# Patient Record
(demographics unavailable — no encounter records)

---

## 2021-09-23 NOTE — EMERGENCY DEPARTMENT REPORT
HPI





- General


Chief Complaint: Wound/Laceration


Time Seen by Provider: 21 14:33





- HPI


HPI: 





Room 6








The patient is 61-year-old male present with chief complaint of nonhealing right

hip wound.  Patient status post total right hip replacement approximate 1 month 

ago.  Patient is in the was sent to the ED for evaluation for nonhealing right 

hip surgical wound.  The patient states he does not personally seen his own 

wound but he was told by the physicians at shelter that it is not healing.  Patient

complains of pain and gives it a score of 8/10.  There has been no history of 

fever





ED Past Medical Hx





- Past Medical History


Hx Diabetes: Yes


Hx Liver Disease: Yes


Hx Asthma: Yes


Hx COPD: Yes


Additional medical history: Bronchitis





- Surgical History


Additional Surgical History: Right hip replacement.  Herniorrhaphy





- Family History


Family history: no significant





- Social History


Smoking Status: Former Smoker


Substance Use Type: None





- Medications


Home Medications: 


                                Home Medications











 Medication  Instructions  Recorded  Confirmed  Last Taken  Type


 


Xanax TAB 2 mg PO PRN 21 Unknown History














ED Review of Systems


ROS: 


Stated complaint: LEFT HIP PAIN/IN CUSTODY


Other details as noted in HPI





Constitutional: denies: fever


Eyes: denies: eye pain


ENT: denies: throat pain


Respiratory: no symptoms reported


Cardiovascular: denies: chest pain


Endocrine: no symptoms reported


Gastrointestinal: denies: abdominal pain, nausea, vomiting


Musculoskeletal: arthralgia


Skin: lesions





Physical Exam





- Physical Exam


Vital Signs: 


                                   Vital Signs











  21





  14:05


 


Temperature 98.5 F


 


Blood Pressure 150/73





[Left] 











Physical Exam: 





GENERAL: The patient is well-developed well-nourished male lying on stretcher 

not appearing to be in acute distress. []


HEENT: Normocephalic.  Atraumatic.  Extraocular motions are intact.  Patient has

 moist mucous membranes.


NECK: Supple.  Trachea midline


CHEST/LUNGS: Clear to auscultation.  There is no respiratory distress noted.


HEART/CARDIOVASCULAR: Regular.  There is no tachycardia.  There is no gallop rub

 or murmur.


ABDOMEN: Abdomen is soft, nontender.  Patient has normal bowel sounds.  There is

 no abdominal distention.


SKIN: There is a pedunculated mass/granulation tissue at the distal region of 

the right hip surgical site.  There appears to be serosanguineous drainage 

present on the gauze.  No purulent discharge visualized.  No surrounding 

cellulitis appreciated.


NEURO: The patient is awake, alert, and oriented.  The patient is cooperative.  

The patient has no focal neurologic deficits.  The patient has normal speech.  

GCS 15


MUSCULOSKELETAL:  There is no evidence of acute injury.





ED Course


                                   Vital Signs











  21





  14:05


 


Temperature 98.5 F


 


Blood Pressure 150/73





[Left] 














- Consultations


Consultation #1: 





21 14:53


Photograph of surgical site sent to and Case discussed with Dr. Aly- 

recommends plain film x-ray of the right hip, CBC as well as a CT scan of the 

right hip to rule out fluid collection.  If work-up negative may do wet-to-dry 

dressing changes and have patient follow-up in office


Consultation #2: 





21 16:16


Ortho paged





ED Medical Decision Making





- Lab Data


Result diagrams: 


                                 21 15:15








- Radiology Data


Radiology results: report reviewed (CT right lower extremity, right hip x-ray), 

image reviewed (CT right lower extremity, right hip x-ray)


interpreted by me: 





Right hip x-ray-no acute fracture appreciated.  Prosthesis in place





[facility] [address] [id number] Cat

 Scan Report Signed Patient: RYAN CONDON MR#: [id number] : 1960 

Acct:[id number] Age/Sex: 61 / M ADM Date: 21 Loc: ED Attending Dr: 

Ordering Physician: CRESCENCIO CAMPOVERDE MD Date of Service: 21 Procedure(s): CT 

lower extremity RT wo con Accession Number(s): [id number] cc: CRESCENCIO CAMPOVERDE MD CT 

right hip without contrast INDICATION : Postop, Nonhealing wound. TECHNIQUE: 

Axial imaging performed through the right hip without the use of contrast. All 

CT scans at this location are performed using CT dose reduction for ALARA by 

means of automated exposure control. COMPARISON: Right hip radiograph from 

2021 FINDINGS: There is a periprosthetic fracture centered about the tip of

 the femoral stem component, with obliquely are noted fracture line involving 

the lateral aspect of the proximal one half of the femoral diaphysis. Fracture 

is not significantly displaced. The remaining bones about the right hip are 

intact and unremarkable. No hardware complication otherwise. There is streak 

artifact from the hip arthroplasty which limits the exam; however, there is a 

soft tissue wound extending laterally from the level of roughly the greater tr

ochanter with exit to the skin surface. There is a mildly complex collection 

directly abutting the trochanter which contains fluid intermixed with air and 

measures approximately 2.9 x 2.6 by roughly 5-6 cm craniocaudal. IMPRESSION: 1. 

Periprosthetic fracture as outlined above. 2. Complex collection tracking along 

the lateral aspect of the proximal femur as outlined above with sinus tract 

extending to the subcutaneous tissues along the lateral proximal thigh. Findings

 are worrisome for abscess formation. Signer Name: German Bowman MD Signed: 

2021 3:31 PM Workstation Name: [id number] Transcribed By: GLENN Dictated By: 

German Bowman MD Electronically Authenticated By: German Bowman MD 

Signed Date/Time: 21 1531 DD/DT: 21 1528 TD/TT: 


Print Cancel 








[facility] [address] [place] [address] 

XRay Report Signed Patient: RYAN CONDON MR#: [id number] : 1960 

Acct:[id number] Age/Sex: 61 / M ADM Date: 21 Loc: ED Attending Dr: 

Ordering Physician: CRESCENCIO CAMPOVERDE MD Date of Service: 21 Procedure(s): XR 

hip 2-3V RT Accession Number(s): [id number] cc: CRESCENCIO CAMPOVERDE MD Fluoro Time In Min

utes: TRAUMATIC INDICATION / CLINICAL INFORMATION: Postop, nonhealing wound 

COMPARISON: None available. FINDINGS: BONES / JOINT(S): The periprosthetic 

fracture in the femur noted on CT imaging performed earlier today is not 

identifiable on these conventional images. There is mild narrowing of the left 

hip joint space. There is a right hip arthroplasty. No loosening is seen. SOFT 

TISSUES: There is mild soft tissue swelling laterally over the right hip. 

ADDITIONAL FINDINGS: None. Signer Name: Hugo Baig MD Signed: 2021 3:51 

PM Workstation Name: [id number] Periprosthetic fracture seen. From the 

Transcribed By: GLORY Dictated By: Hugo Baig MD Electronically 

Authenticated By: Hugo Baig MD Signed Date/Time: 21 1551 DD/DT: 

21 1544 TD/TT: 


Print Cancel 











- Differential Diagnosis


Nonhealing surgical wound


Critical care attestation.: 


If time is entered above; I have spent that time in minutes in the direct care 

of this critically ill patient, excluding procedure time.








ED Disposition


Clinical Impression: 


 Nonhealing surgical wound, Postoperative abscess





Disposition:  ADMITTED AS INPATIENT


Is pt being admited?: Yes


Does the pt Need Aspirin: No


Condition: Fair


Referrals: 


PRIMARY CARE,MD [Primary Care Provider] - 3-5 Days


Time of Disposition: 19:02 (Hospitalist notified (Dr. Baca))

## 2021-09-23 NOTE — XRAY REPORT
TRAUMATIC



INDICATION / CLINICAL INFORMATION:

Postop, nonhealing wound



COMPARISON:

None available.

 

FINDINGS:



BONES / JOINT(S): The periprosthetic fracture in the femur noted on CT imaging performed earlier toda
y is not identifiable on these conventional images. There is mild narrowing of the left hip joint spa
ce. There is a right hip arthroplasty. No loosening is seen.

SOFT TISSUES: There is mild soft tissue swelling laterally over the right hip.



ADDITIONAL FINDINGS: None.







Signer Name: Hugo Baig MD 

Signed: 9/23/2021 3:51 PM

Workstation Name: [id number] 

Periprosthetic fracture seen. From the

## 2021-09-23 NOTE — CAT SCAN REPORT
CT right hip without contrast



INDICATION :  Postop, Nonhealing wound.



TECHNIQUE:  Axial imaging performed through the right hip without the use of contrast.  All CT scans 
at this location are performed using CT dose reduction for ALARA by means of automated exposure contr
ol. 



COMPARISON:  Right hip radiograph from 8/12/2021



FINDINGS: There is a periprosthetic fracture centered about the tip of the femoral stem component, wi
th obliquely are noted fracture line involving the lateral aspect of the proximal one half of the fem
oral diaphysis. Fracture is not significantly displaced. The remaining bones about the right hip are 
intact and unremarkable. No hardware complication otherwise.



There is streak artifact from the hip arthroplasty which limits the exam; however, there is a soft ti
ssue wound extending laterally from the level of roughly the greater trochanter with exit to the skin
 surface. There is a mildly complex collection directly abutting the trochanter which contains fluid 
intermixed with air and measures approximately 2.9 x 2.6 by roughly 5-6 cm craniocaudal.





IMPRESSION: 

1. Periprosthetic fracture as outlined above.

2. Complex collection tracking along the lateral aspect of the proximal femur as outlined above with 
sinus tract extending to the subcutaneous tissues along the lateral proximal thigh. Findings are worr
isome for abscess formation.



Signer Name: German Bowman MD 

Signed: 9/23/2021 3:31 PM

Workstation Name: [id number]

## 2021-09-24 NOTE — HISTORY AND PHYSICAL REPORT
History of Present Illness


Date of examination: 09/23/21


Date of admission: 


09/23/21 19:09





Chief complaint: 


Drainage on the right hip surgical site





History of present illness: 


61-year-old male with history of EtOH dependence, hepatitis/cirrhosis, diabetes 

and COPD had total right hip replacement on 8/12/2021 and [address] [place] [address].  Patient was doing well for couple of weeks.  Patient has been 

incarcerated for the last 13 months.  Over the last couple weeks patient has 





Previous admission in August 1 week


Patient is 61 yo  Male with COPD, Nicotine Dependence, ETOH Dependence 

complicated by Cirrhosis, EDWAR, Mild Intermittent Asthma presents to ED for ev

aluation following a fall.  The patient was seen and evaluated in the emergency 

department. Patient found to have right hip fracture, as well as metabolic 

acidosis.  Patient admitted to surgical floor.  Orthopedic surgery service 

consulted.


Right hip fracture with right total hip replacement on 8/12/2021








- Past Medical History


--Diabetes: Yes


--Liver Disease: Yes


--Asthma: Yes


--COPD: Yes


--Additional medical history: Bronchitis





- Surgical History


Additional Surgical History: Right hip replacement.  Herniorrhaphy





- Family History


Family history: no significant





- Social History


Smoking Status: Former Smoker


Substance Use Type: None





- Medications


Home Medications: 


                                Home Medications











 Medication  Instructions  Recorded  Confirmed  Last Taken  Type


 


Xanax TAB 2 mg PO PRN 09/23/21 09/23/21 Unknown History














Review of Systems


ROS: 


Stated complaint: LEFT HIP PAIN/IN CUSTODY


Other details as noted in HPI





Constitutional: denies: fever


Eyes: denies: eye pain


ENT: denies: throat pain


Respiratory: no symptoms reported


Cardiovascular: denies: chest pain


Endocrine: no symptoms reported


Gastrointestinal: denies: abdominal pain, nausea, vomiting


Musculoskeletal: arthralgia


Skin: lesions








Medications and Allergies


                                    Allergies











Allergy/AdvReac Type Severity Reaction Status Date / Time


 


ketorolac tromethamine Allergy  Unknown Verified 09/23/21 14:05





[From Toradol]     











                                Home Medications











 Medication  Instructions  Recorded  Confirmed  Last Taken  Type


 


Xanax TAB 2 mg PO PRN 09/23/21 09/23/21 Unknown History











Active Meds: 


Active Medications





Hydromorphone HCl (Hydromorphone 1 Mg/1 Ml Inj)  0.5 mg IV Q3H PRN


   PRN Reason: Pain , Severe (7-10)


   Last Admin: 09/23/21 22:00 Dose:  0.5 mg


   Documented by: 











Exam





- Constitutional


Vitals: 


                                        











Temp Pulse Resp BP Pulse Ox


 


 98.5 F         149/52   98 


 


 09/23/21 14:05        09/23/21 17:31  09/23/21 17:31











General appearance: Present: no acute distress, well-nourished





- EENT


Eyes: Present: PERRL


ENT: hearing intact, clear oral mucosa





- Neck


Neck: Present: supple, normal ROM





- Respiratory


Respiratory effort: normal


Respiratory: bilateral: CTA





- Cardiovascular


Rhythm: regular


Heart Sounds: Present: S1 & S2.  Absent: rub, click





- Extremities


Extremities: pulses symmetrical, No edema, abnormal (Right hip site drainage and

tenderness present, decreased range of motion)


Extremity abnormal: other (Right hip site drainage and tenderness present)


Peripheral Pulses: within normal limits





- Abdominal


General gastrointestinal: Present: soft, non-tender, non-distended, normal bowel

sounds


Male genitourinary: Present: normal





- Integumentary


Integumentary: Present: clear, warm, dry





- Musculoskeletal


Musculoskeletal: gait normal, strength equal bilaterally





- Psychiatric


Psychiatric: appropriate mood/affect, intact judgment & insight





- Neurologic


Neurologic: CNII-XII intact, moves all extremities





Results





- Labs


CBC & Chem 7: 


                                 09/23/21 15:15





                                 09/23/21 20:18


Labs: 


                             Laboratory Last Values











WBC  5.5 K/mm3 (4.5-11.0)   09/23/21  15:15    


 


RBC  4.58 M/mm3 (3.65-5.03)   09/23/21  15:15    


 


Hgb  14.0 gm/dl (11.8-15.2)   09/23/21  15:15    


 


Hct  41.7 % (35.5-45.6)   09/23/21  15:15    


 


MCV  91 fl (84-94)   09/23/21  15:15    


 


MCH  31 pg (28-32)   09/23/21  15:15    


 


MCHC  34 % (32-34)   09/23/21  15:15    


 


RDW  15.8 % (13.2-15.2)  H  09/23/21  15:15    


 


Plt Count  126 K/mm3 (140-440)  L  09/23/21  15:15    


 


Lymph % (Auto)  22.4 % (13.4-35.0)   09/23/21  15:15    


 


Mono % (Auto)  10.9 % (0.0-7.3)  H  09/23/21  15:15    


 


Eos % (Auto)  2.1 % (0.0-4.3)   09/23/21  15:15    


 


Baso % (Auto)  0.5 % (0.0-1.8)   09/23/21  15:15    


 


Lymph # (Auto)  1.2 K/mm3 (1.2-5.4)   09/23/21  15:15    


 


Mono # (Auto)  0.6 K/mm3 (0.0-0.8)   09/23/21  15:15    


 


Eos # (Auto)  0.1 K/mm3 (0.0-0.4)   09/23/21  15:15    


 


Baso # (Auto)  0.0 K/mm3 (0.0-0.1)   09/23/21  15:15    


 


Seg Neutrophils %  64.1 % (40.0-70.0)   09/23/21  15:15    


 


Seg Neutrophils #  3.5 K/mm3 (1.8-7.7)   09/23/21  15:15    


 


Sodium  134 mmol/L (137-145)  L  09/23/21  20:18    


 


Potassium  4.3 mmol/L (3.6-5.0)   09/23/21  20:18    


 


Chloride  101.9 mmol/L ()   09/23/21  20:18    


 


Carbon Dioxide  21 mmol/L (22-30)  L  09/23/21  20:18    


 


Anion Gap  15 mmol/L  09/23/21  20:18    


 


BUN  15 mg/dL (9-20)   09/23/21  20:18    


 


Creatinine  0.6 mg/dL (0.8-1.3)  L  09/23/21  20:18    


 


Estimated GFR  > 60 ml/min  09/23/21  20:18    


 


BUN/Creatinine Ratio  25 %  09/23/21  20:18    


 


Glucose  232 mg/dL ()  H  09/23/21  20:18    


 


Calcium  9.0 mg/dL (8.4-10.2)   09/23/21  20:18    


 


Urine Color  Yellow  (Yellow)   09/23/21  Unknown


 


Urine Turbidity  Slightly-cloudy  (Clear)   09/23/21  Unknown


 


Urine pH  6.0  (5.0-7.0)   09/23/21  Unknown


 


Ur Specific Gravity  1.024  (1.003-1.030)   09/23/21  Unknown


 


Urine Protein  30 mg/dl mg/dL (Negative)   09/23/21  Unknown


 


Urine Glucose (UA)  50 mg/dL (Negative)   09/23/21  Unknown


 


Urine Ketones  Neg mg/dL (Negative)   09/23/21  Unknown


 


Urine Blood  Neg  (Negative)   09/23/21  Unknown


 


Urine Nitrite  Neg  (Negative)   09/23/21  Unknown


 


Urine Bilirubin  Neg  (Negative)   09/23/21  Unknown


 


Urine Urobilinogen  4.0 mg/dL (<2.0)   09/23/21  Unknown


 


Ur Leukocyte Esterase  Neg  (Negative)   09/23/21  Unknown


 


Urine WBC (Auto)  3.0 /HPF (0.0-6.0)   09/23/21  Unknown


 


Urine RBC (Auto)  4.0 /HPF (0.0-6.0)   09/23/21  Unknown


 


Calcium Oxalate Crystal  Few   09/23/21  Unknown


 


Urine Mucus  1+ /HPF  09/23/21  Unknown


 


Urine Yeast (Budding)  1+ /HPF  09/23/21  Unknown


 


Urine Opiates Screen  Negative   09/23/21  Unknown


 


Urine Methadone Screen  Negative   09/23/21  Unknown


 


Ur Barbiturates Screen  Negative   09/23/21  Unknown


 


Ur Phencyclidine Scrn  Negative   09/23/21  Unknown


 


Ur Amphetamines Screen  Negative   09/23/21  Unknown


 


U Benzodiazepines Scrn  Negative   09/23/21  Unknown


 


Urine Cocaine Screen  Negative   09/23/21  Unknown


 


U Marijuana (THC) Screen  Negative   09/23/21  Unknown


 


Drugs of Abuse Note  Disclamer   09/23/21  Unknown











Microbiology: 


Microbiology





09/23/21 19:08   Peripheral/Venous   Blood Culture - Preliminary


                            Culture in Progress


09/23/21 19:02   Peripheral/Venous   Blood Culture - Preliminary


                            Culture in Progress











- Imaging and Cardiology


Imaging and Cardiology: 





Lower extremity CAT scan


Periprosthetic fracture centered over the tip of the femoral stem component


Complex collection tracking along the lateral border of the proximal femur as 

outlined above with sinus tract extending to the subcutaneous tissues along the 

lateral proximal thigh


Findings are worrisome for abscess formation





Assessment and Plan


Advance Directives: Yes


Plan of care discussed with patient/family: Yes





- Patient Problems


(1) Postoperative abscess


Current Visit: Yes   Status: Acute   


Plan to address problem: 


Abscess in the right hip region at the postoperative site with possible 

recurrent fracture


IV Unasyn and IV vancomycin started


Orthopedic consult


May need revision surgery and abscess drainage


ID also consulted








(2) COPD (chronic obstructive pulmonary disease)


Current Visit: No   Status: Chronic   


Qualifiers: 


   COPD type: chronic bronchitis 


Plan to address problem: 


Nebulizer treatments as needed








(3) Hypertension


Current Visit: Yes   Status: Chronic   


Qualifiers: 


   Hypertension type: primary hypertension   Qualified Code(s): I10 - Essential 

(primary) hypertension   


Plan to address problem: 


Continue antihypertensives and adjust medications








(4) T2DM (type 2 diabetes mellitus)


Current Visit: Yes   Status: Chronic   


Qualifiers: 


   Diabetes mellitus long term insulin use: unspecified long term insulin use 

status 


Plan to address problem: 


Continue coverage and check hemoglobin A1c








(5) DVT prophylaxis


Current Visit: Yes   Status: Acute   


Plan to address problem: 


On heparin and GI prophylaxis

## 2021-09-24 NOTE — PROGRESS NOTE
Assessment and Plan


Assessment and plan: 





(1) Postoperative abscess


Current Visit: Yes   Status: Acute   


Plan to address problem: 


Abscess in the right hip region at the postoperative site with possible 

recurrent fracture


IV Unasyn and IV vancomycin started


Orthopedic consult


May need revision surgery and abscess drainage


ID also consulted








(2) COPD (chronic obstructive pulmonary disease)


Current Visit: No   Status: Chronic   


Qualifiers: 


   COPD type: chronic bronchitis 


Plan to address problem: 


Nebulizer treatments as needed








(3) Hypertension


Current Visit: Yes   Status: Chronic   


Qualifiers: 


   Hypertension type: primary hypertension   Qualified Code(s): I10 - Essential 

(primary) hypertension   


Plan to address problem: 


Continue antihypertensives and adjust medications








(4) T2DM (type 2 diabetes mellitus)


Current Visit: Yes   Status: Chronic   


Qualifiers: 


   Diabetes mellitus long term insulin use: unspecified long term insulin use 

status 


Plan to address problem: 


Continue coverage and check hemoglobin A1c








(5) DVT prophylaxis


Current Visit: Yes   Status: Acute   


Plan to address problem: 


On heparin and GI prophylaxis





9/24


-Patient is admitted for right hip fracture, abscess on the right hip area


-Orthopedics and ID consulted


-Patient is currently on Unasyn and vancomycin


-Blood pressure controlled, blood sugars in target


-Continue current management and follow with orthopedics and ID.





History


Interval history: 





Patient was seen and evaluated this morning


Patient is complaining of right hip pain





Hospitalist Physical





- Physical exam


Narrative exam: 





 Not in cardiopulmonary distress. 


 The patient appeared well nourished and normally developed.


 Vital signs as documented.


 Head exam is unremarkable.


 No scleral icterus .


 Neck is without jugular venous distension, thyromegaly, or carotid bruits. 


 Lungs are clear to auscultation.


Cardiac exam reveals regular rate and  Rhythm. 


Abdominal exam reveals normal bowel sounds, nontender, no organomegaly.


Extremities are nonedematous and both femoral and pedal pulses are normal.


CNS: Alert and oriented 3.  No focal weakness.





- Constitutional


Vitals: 


                                        











Temp Pulse Resp BP Pulse Ox


 


 98.5 F   87   19   132/89   98 


 


 09/23/21 14:05  09/24/21 07:58  09/24/21 08:08  09/24/21 07:58  09/24/21 08:08











General appearance: Present: no acute distress, well-nourished





Results





- Labs


CBC & Chem 7: 


                                 09/23/21 15:15





                                 09/23/21 20:18


Labs: 


                             Laboratory Last Values











WBC  5.5 K/mm3 (4.5-11.0)   09/23/21  15:15    


 


RBC  4.58 M/mm3 (3.65-5.03)   09/23/21  15:15    


 


Hgb  14.0 gm/dl (11.8-15.2)   09/23/21  15:15    


 


Hct  41.7 % (35.5-45.6)   09/23/21  15:15    


 


MCV  91 fl (84-94)   09/23/21  15:15    


 


MCH  31 pg (28-32)   09/23/21  15:15    


 


MCHC  34 % (32-34)   09/23/21  15:15    


 


RDW  15.8 % (13.2-15.2)  H  09/23/21  15:15    


 


Plt Count  126 K/mm3 (140-440)  L  09/23/21  15:15    


 


Lymph % (Auto)  22.4 % (13.4-35.0)   09/23/21  15:15    


 


Mono % (Auto)  10.9 % (0.0-7.3)  H  09/23/21  15:15    


 


Eos % (Auto)  2.1 % (0.0-4.3)   09/23/21  15:15    


 


Baso % (Auto)  0.5 % (0.0-1.8)   09/23/21  15:15    


 


Lymph # (Auto)  1.2 K/mm3 (1.2-5.4)   09/23/21  15:15    


 


Mono # (Auto)  0.6 K/mm3 (0.0-0.8)   09/23/21  15:15    


 


Eos # (Auto)  0.1 K/mm3 (0.0-0.4)   09/23/21  15:15    


 


Baso # (Auto)  0.0 K/mm3 (0.0-0.1)   09/23/21  15:15    


 


Seg Neutrophils %  64.1 % (40.0-70.0)   09/23/21  15:15    


 


Seg Neutrophils #  3.5 K/mm3 (1.8-7.7)   09/23/21  15:15    


 


Sodium  134 mmol/L (137-145)  L  09/23/21  20:18    


 


Potassium  4.3 mmol/L (3.6-5.0)   09/23/21  20:18    


 


Chloride  101.9 mmol/L ()   09/23/21  20:18    


 


Carbon Dioxide  21 mmol/L (22-30)  L  09/23/21  20:18    


 


Anion Gap  15 mmol/L  09/23/21  20:18    


 


BUN  15 mg/dL (9-20)   09/23/21  20:18    


 


Creatinine  0.6 mg/dL (0.8-1.3)  L  09/23/21  20:18    


 


Estimated GFR  > 60 ml/min  09/23/21  20:18    


 


BUN/Creatinine Ratio  25 %  09/23/21  20:18    


 


Glucose  232 mg/dL ()  H  09/23/21  20:18    


 


Hemoglobin A1c  6.9 % (4-6)  H  09/23/21  15:15    


 


Calcium  9.0 mg/dL (8.4-10.2)   09/23/21  20:18    


 


Urine Color  Yellow  (Yellow)   09/23/21  Unknown


 


Urine Turbidity  Slightly-cloudy  (Clear)   09/23/21  Unknown


 


Urine pH  6.0  (5.0-7.0)   09/23/21  Unknown


 


Ur Specific Gravity  1.024  (1.003-1.030)   09/23/21  Unknown


 


Urine Protein  30 mg/dl mg/dL (Negative)   09/23/21  Unknown


 


Urine Glucose (UA)  50 mg/dL (Negative)   09/23/21  Unknown


 


Urine Ketones  Neg mg/dL (Negative)   09/23/21  Unknown


 


Urine Blood  Neg  (Negative)   09/23/21  Unknown


 


Urine Nitrite  Neg  (Negative)   09/23/21  Unknown


 


Urine Bilirubin  Neg  (Negative)   09/23/21  Unknown


 


Urine Urobilinogen  4.0 mg/dL (<2.0)   09/23/21  Unknown


 


Ur Leukocyte Esterase  Neg  (Negative)   09/23/21  Unknown


 


Urine WBC (Auto)  3.0 /HPF (0.0-6.0)   09/23/21  Unknown


 


Urine RBC (Auto)  4.0 /HPF (0.0-6.0)   09/23/21  Unknown


 


Calcium Oxalate Crystal  Few   09/23/21  Unknown


 


Urine Mucus  1+ /HPF  09/23/21  Unknown


 


Urine Yeast (Budding)  1+ /HPF  09/23/21  Unknown


 


Urine Opiates Screen  Negative   09/23/21  Unknown


 


Urine Methadone Screen  Negative   09/23/21  Unknown


 


Ur Barbiturates Screen  Negative   09/23/21  Unknown


 


Ur Phencyclidine Scrn  Negative   09/23/21  Unknown


 


Ur Amphetamines Screen  Negative   09/23/21  Unknown


 


U Benzodiazepines Scrn  Negative   09/23/21  Unknown


 


Urine Cocaine Screen  Negative   09/23/21  Unknown


 


U Marijuana (THC) Screen  Negative   09/23/21  Unknown


 


Drugs of Abuse Note  Disclamer   09/23/21  Unknown











Microbiology: 


Microbiology





09/23/21 19:08   Peripheral/Venous   Blood Culture - Preliminary


                            Culture in Progress


09/23/21 19:02   Peripheral/Venous   Blood Culture - Preliminary


                            Culture in Progress











Active Medications





- Current Medications


Current Medications: 














Generic Name Dose Route Start Last Admin





  Trade Name Freq  PRN Reason Stop Dose Admin


 


Acetaminophen  650 mg  09/24/21 00:20 





  Acetaminophen 325 Mg Tab  PO  





  Q4H PRN  





  Pain MILD(1-3)/Fever >100.5/HA  


 


Albuterol/Ipratropium  1 ampul  09/24/21 07:38 





  Ipratropium/Albuterol Sulfate 3 Ml Ampul.Neb  IH  





  Q3H PRN  





  Wheezing  


 


Famotidine  20 mg  09/24/21 10:00 





  Famotidine 20 Mg/2 Ml Inj  IV  





  BID [facility]  


 


Heparin Sodium (Porcine)  5,000 unit  09/24/21 10:00 





  Heparin 5,000 Unit/1 Ml Vial  SUB-Q  





  Q12HR [facility]  


 


Hydromorphone HCl  0.5 mg  09/23/21 20:27  09/23/21 22:00





  Hydromorphone 1 Mg/1 Ml Inj  IV   0.5 mg





  Q3H PRN   Administration





  Pain , Severe (7-10)  


 


Ampicillin Sodium/Sulbactam Sodium  3 gm in 100 mls @ 100 mls/hr  09/24/21 01:00

 09/24/21 07:36





  Unasyn/Ns 3 Gm/100 Ml  IV   100 mls/hr





  Q6H [facility]   Administration





  Protocol  


 


Vancomycin HCl 1,500 mg/  530 mls @ 333.333 mls/hr  09/24/21 10:00 





  Sodium Chloride  IV  





  Q12H [facility]  


 


Insulin Human Lispro  0 unit  09/24/21 11:30 





  Insulin Lispro 100 Unit/Ml  SUB-Q  





  [facility]  





  Protocol  


 


Metoclopramide HCl  10 mg  09/24/21 00:20 





  Metoclopramide 10 Mg/2 Ml Inj  IV  





  Q6H PRN  





  Nausea And Vomiting  


 


Morphine Sulfate  2 mg  09/24/21 00:20  09/24/21 04:50





  Morphine 2 Mg/1 Ml Inj  IV   2 mg





  Q4H PRN   Administration





  Pain, Moderate (4-6)  


 


Ondansetron HCl  4 mg  09/24/21 00:20 





  Ondansetron 4 Mg/2 Ml Inj  IV  





  Q8H PRN  





  Nausea And Vomiting  


 


Sodium Chloride  10 ml  09/24/21 10:00 





  Sodium Chloride 0.9% 10 Ml Flush Syringe  IV  





  BID [facility]  


 


Sodium Chloride  10 ml  09/24/21 00:20 





  Sodium Chloride 0.9% 10 Ml Flush Syringe  IV  





  PRN PRN  





  LINE FLUSH

## 2021-09-24 NOTE — CONSULTATION
History of Present Illness





- HPI


Consult date: 09/24/21


Consult reason: other


History of present illness: 





62 y/o male who sustained a displaced right femoral neck fracture, s/p total hip

replacement August 6, 2021...transferred from [facility] due to drainage from 

right hip incision. Patient currently denies pain, states he's been walking with

walker at the [facility]...denies fever chills...





Medications and Allergies


                                    Allergies











Allergy/AdvReac Type Severity Reaction Status Date / Time


 


ketorolac tromethamine Allergy  Unknown Verified 09/23/21 14:05





[From Toradol]     











                                Home Medications











 Medication  Instructions  Recorded  Confirmed  Last Taken  Type


 


Xanax TAB 2 mg PO PRN 09/23/21 09/23/21 Unknown History











Active Meds: 


Active Medications





Acetaminophen (Acetaminophen 325 Mg Tab)  650 mg PO Q4H PRN


   PRN Reason: Pain MILD(1-3)/Fever >100.5/HA


Albuterol (Albuterol 2.5 Mg/3 Ml Nebu)  2.5 mg IH Q4HRT PRN


   PRN Reason: Shortness Of Breath


Famotidine (Famotidine 20 Mg/2 Ml Inj)  20 mg IV BID [facility]


   Last Admin: 09/24/21 09:45 Dose:  20 mg


   Documented by: 


Heparin Sodium (Porcine) (Heparin 5,000 Unit/1 Ml Vial)  5,000 unit SUB-Q Q12HR 

[facility]


   Last Admin: 09/24/21 09:44 Dose:  5,000 unit


   Documented by: 


Hydromorphone HCl (Hydromorphone 1 Mg/1 Ml Inj)  0.5 mg IV Q3H PRN


   PRN Reason: Pain , Severe (7-10)


   Last Admin: 09/23/21 22:00 Dose:  0.5 mg


   Documented by: 


Vancomycin HCl 1,500 mg/ (Sodium Chloride)  530 mls @ 333.333 mls/hr IV Q12H [facility]


   Last Admin: 09/24/21 11:08 Dose:  333.333 mls/hr


   Documented by: 


Cefepime HCl (Cefepime/Ns 2 Gm/100 Ml)  2 gm in 100 mls @ 200 mls/hr IV Q12H 

[facility]; Protocol


   Last Admin: 09/24/21 14:00 Dose:  200 mls/hr


   Documented by: 


Insulin Human Lispro (Insulin Lispro 100 Unit/Ml)  0 unit SUB-Q [facility]; 

Protocol


   Last Admin: 09/24/21 11:09 Dose:  3 unit


   Documented by: 


Metoclopramide HCl (Metoclopramide 10 Mg/2 Ml Inj)  10 mg IV Q6H PRN


   PRN Reason: Nausea And Vomiting


Morphine Sulfate (Morphine 2 Mg/1 Ml Inj)  2 mg IV Q4H PRN


   PRN Reason: Pain, Moderate (4-6)


   Last Admin: 09/24/21 04:50 Dose:  2 mg


   Documented by: 


Ondansetron HCl (Ondansetron 4 Mg/2 Ml Inj)  4 mg IV Q8H PRN


   PRN Reason: Nausea And Vomiting


Sodium Chloride (Sodium Chloride 0.9% 10 Ml Flush Syringe)  10 ml IV BID [facility]


   Last Admin: 09/24/21 11:45 Dose:  10 ml


   Documented by: 


Sodium Chloride (Sodium Chloride 0.9% 10 Ml Flush Syringe)  10 ml IV PRN PRN


   PRN Reason: LINE FLUSH











Physical Examination





- Physical exam


Narrative exam: 





right hip - no redness/erythema noted, small area hypergranulation tissue at 

inferior incision with sinus tract, no odor, good passive RoM





Assessment and Plan





Right hip drainage


WBC and temperature WNL


patient does not appear septic at this time, would recommend conservative 

management....

## 2021-09-24 NOTE — CONSULTATION
History of Present Illness





- Reason for Consult


Consult date: 09/24/21


Hip infection


Requesting physician: SERGIO MANJARREZ





- History of Present Illness





The patient is a 61-year-old male with COPD, nicotine dependence, alcohol abuse,

cirrhosis, currently incarcerated, recently hospitalized with right hip fracture

and underwent right DIMPLE on 8/12/2021 was readmitted yesterday with complaints of

drainage from the right hip surgical site.  Denies any fever.  Reports increased

pain.  Was started on empiric antibiotics.  Infectious diseases was consulted 

for additional evaluation.  Hip x-ray revealed a periprosthetic fracture, CT 

revealed a periprosthetic fracture and a complex collection tracking along the 

lateral aspect of the proximal femur with a sinus tract extending to the 

subcutaneous tissues concerning for abscess formation.





Review of Systems: 


General: no fevers,chills or rigors


HEENT: no new visual disturbance


Respiratory: No cough, sputum, hemoptysis or shortness of breath


Cardiovascular: No chest pain, syncope


Gastrointestinal: No nausea, vomiting or diarrhea


Genitourinary: No dysuria or hematuria


Musculoskeletal: No new or worsening neck pain or back pain 


Neurologic: No headaches, seizures


Hematologic: No easy bruising or bleeding


Endocrine: No night sweats or acute weight loss


Skin: negative for rash, jaundice


Psychiatric: No suicidal or homicidal ideation





Medications and Allergies


                                    Allergies











Allergy/AdvReac Type Severity Reaction Status Date / Time


 


ketorolac tromethamine Allergy  Unknown Verified 09/23/21 14:05





[From Toradol]     











                                Home Medications











 Medication  Instructions  Recorded  Confirmed  Last Taken  Type


 


Xanax TAB 2 mg PO PRN 09/23/21 09/23/21 Unknown History











Active Meds: 


Active Medications





Acetaminophen (Acetaminophen 325 Mg Tab)  650 mg PO Q4H PRN


   PRN Reason: Pain MILD(1-3)/Fever >100.5/HA


Albuterol (Albuterol 2.5 Mg/3 Ml Nebu)  2.5 mg IH Q4HRT PRN


   PRN Reason: Shortness Of Breath


Famotidine (Famotidine 20 Mg/2 Ml Inj)  20 mg IV BID [facility]


   Last Admin: 09/24/21 09:45 Dose:  20 mg


   Documented by: 


Heparin Sodium (Porcine) (Heparin 5,000 Unit/1 Ml Vial)  5,000 unit SUB-Q Q12HR 

[facility]


   Last Admin: 09/24/21 09:44 Dose:  5,000 unit


   Documented by: 


Hydromorphone HCl (Hydromorphone 1 Mg/1 Ml Inj)  0.5 mg IV Q3H PRN


   PRN Reason: Pain , Severe (7-10)


   Last Admin: 09/23/21 22:00 Dose:  0.5 mg


   Documented by: 


Vancomycin HCl 1,500 mg/ (Sodium Chloride)  530 mls @ 333.333 mls/hr IV Q12H [facility]


   Last Admin: 09/24/21 11:08 Dose:  333.333 mls/hr


   Documented by: 


Cefepime HCl (Cefepime/Ns 2 Gm/100 Ml)  2 gm in 100 mls @ 200 mls/hr IV Q12HR 

[facility]; Protocol


Insulin Human Lispro (Insulin Lispro 100 Unit/Ml)  0 unit SUB-Q ACH[facility]; 

Protocol


   Last Admin: 09/24/21 11:09 Dose:  3 unit


   Documented by: 


Metoclopramide HCl (Metoclopramide 10 Mg/2 Ml Inj)  10 mg IV Q6H PRN


   PRN Reason: Nausea And Vomiting


Morphine Sulfate (Morphine 2 Mg/1 Ml Inj)  2 mg IV Q4H PRN


   PRN Reason: Pain, Moderate (4-6)


   Last Admin: 09/24/21 04:50 Dose:  2 mg


   Documented by: 


Ondansetron HCl (Ondansetron 4 Mg/2 Ml Inj)  4 mg IV Q8H PRN


   PRN Reason: Nausea And Vomiting


Sodium Chloride (Sodium Chloride 0.9% 10 Ml Flush Syringe)  10 ml IV BID [facility]


   Last Admin: 09/24/21 11:45 Dose:  10 ml


   Documented by: 


Sodium Chloride (Sodium Chloride 0.9% 10 Ml Flush Syringe)  10 ml IV PRN PRN


   PRN Reason: LINE FLUSH











Physical Examination





- Physical Exam


Narrative exam: 





Physical Exam: 


Constitutional: Alert, cooperative. No acute distress


Head, Ears, Nose: Normocephalic, atraumatic. External ears, nose normal


Eyes: Conjunctivae/corneas clear. No icterus. No ptosis.


Neck: Supple, no meningeal signs


Oral: Poor dentition


Cardiovascular: S1, S2 +


Respiratory: Good air entry, clear to auscultation bilaterally


GI: Soft, non-tender; bowel sounds normal. No peritoneal signs


Musculoskeletal: Right hip incision with drainage, dressing present


Skin: No rash or abscess


Hem/Lymphatic: No palpable cervical or supraclavicular nodes. No lymphangitis


Psych: Mood ok. Affect normal


Neurological: Awake, alert, oriented. No gross abnormality





- Constitutional


Vitals: 


                                   Vital Signs











Temp Pulse Resp BP Pulse Ox


 


 98.5 F   88   19   124/71   99 


 


 09/23/21 14:05  09/24/21 11:54  09/24/21 11:54  09/24/21 11:54  09/24/21 11:54








                           Temperature -Last 24 Hours











Temperature                    98.5 F

















Results





- Labs


CBC & Chem 7: 


                                 09/23/21 15:15





                                 09/23/21 20:18


Labs: 


                              Abnormal lab results











  09/23/21 09/23/21 09/23/21 Range/Units





  15:15 15:15 18:57 


 


RDW  15.8 H    (13.2-15.2)  %


 


Plt Count  126 L    (140-440)  K/mm3


 


Mono % (Auto)  10.9 H    (0.0-7.3)  %


 


Sodium    134 L  (137-145)  mmol/L


 


Carbon Dioxide    21 L  (22-30)  mmol/L


 


Creatinine    0.5 L  (0.8-1.3)  mg/dL


 


Glucose    270 H  ()  mg/dL


 


POC Glucose     ()  mg/dL


 


Hemoglobin A1c   6.9 H   (4-6)  %














  09/23/21 09/24/21 Range/Units





  20:18 11:06 


 


RDW    (13.2-15.2)  %


 


Plt Count    (140-440)  K/mm3


 


Mono % (Auto)    (0.0-7.3)  %


 


Sodium  134 L   (137-145)  mmol/L


 


Carbon Dioxide  21 L   (22-30)  mmol/L


 


Creatinine  0.6 L   (0.8-1.3)  mg/dL


 


Glucose  232 H   ()  mg/dL


 


POC Glucose   178 H  ()  mg/dL


 


Hemoglobin A1c    (4-6)  %














Assessment and Plan


Cultures:


9/23/2021 blood culture: In process





A/P:


61-year-old male with COPD, nicotine dependence, alcohol abuse, cirrhosis, 

currently incarcerated, recently hospitalized with right hip fracture and 

underwent right DIMPLE on 8/12/2021:





#Surgical site infection, right hip prosthetic joint infection, periprosthetic 

fracture and concern for abscess





Recs:


-Stat wound culture ordered


-Follow-up orthopedics consultation for I&D and additional surgical plan


-Empiric IV cefepime, vancomycin for now





Kacy Hawkins MD, FACP


[facility] ([facility])


O: [phone]


F: [phone]

## 2021-09-25 NOTE — PROGRESS NOTE
Assessment and Plan


Assessment and plan: 





(1) Postoperative abscess


Current Visit: Yes   Status: Acute   


Plan to address problem: 


Abscess in the right hip region at the postoperative site with possible 

recurrent fracture


IV Unasyn and IV vancomycin started


Orthopedic consult


May need revision surgery and abscess drainage


ID also consulted








(2) COPD (chronic obstructive pulmonary disease)


Current Visit: No   Status: Chronic   


Qualifiers: 


   COPD type: chronic bronchitis 


Plan to address problem: 


Nebulizer treatments as needed








(3) Hypertension


Current Visit: Yes   Status: Chronic   


Qualifiers: 


   Hypertension type: primary hypertension   Qualified Code(s): I10 - Essential 

(primary) hypertension   


Plan to address problem: 


Continue antihypertensives and adjust medications








(4) T2DM (type 2 diabetes mellitus)


Current Visit: Yes   Status: Chronic   


Qualifiers: 


   Diabetes mellitus long term insulin use: unspecified long term insulin use 

status 


Plan to address problem: 


Continue coverage and check hemoglobin A1c








(5) DVT prophylaxis


Current Visit: Yes   Status: Acute   


Plan to address problem: 


On heparin and GI prophylaxis





9/24


-Patient is admitted for right hip fracture, abscess on the right hip area


-Orthopedics and ID consulted


-Patient is currently on Unasyn and vancomycin


-Blood pressure controlled, blood sugars in target


-Continue current management and follow with orthopedics and ID.





9/25


-I&D of the abscess was done by orthopedics.  Recommend conservative management


-ID saw the patient and recommend to continue empiric cefepime and vancomycin


-Wound cultures pending


-Patient has severe pain and on Percocet


-PT evaluation





History


Interval history: 





Patient was seen and evaluated this morning


Patient is complaining of severe right hip pain





Hospitalist Physical





- Physical exam


Narrative exam: 





 Not in cardiopulmonary distress. 


 The patient appeared well nourished and normally developed.


 Vital signs as documented.


 Head exam is unremarkable.


 No scleral icterus .


 Neck is without jugular venous distension, thyromegaly, or carotid bruits. 


 Lungs are clear to auscultation.


Cardiac exam reveals regular rate and  Rhythm. 


Abdominal exam reveals normal bowel sounds, nontender, no organomegaly.


Extremities are nonedematous and both femoral and pedal pulses are normal.


CNS: Alert and oriented 3.  No focal weakness.





- Constitutional


Vitals: 


                                        











Temp Pulse Resp BP Pulse Ox


 


 98.8 F   96 H  20   150/87   96 


 


 09/25/21 07:48  09/25/21 07:48  09/25/21 07:48  09/25/21 07:48  09/25/21 07:48











General appearance: Present: no acute distress, well-nourished





Results





- Labs


CBC & Chem 7: 


                                 09/23/21 15:15





                                 09/23/21 20:18


Labs: 


                             Laboratory Last Values











WBC  5.5 K/mm3 (4.5-11.0)   09/23/21  15:15    


 


RBC  4.58 M/mm3 (3.65-5.03)   09/23/21  15:15    


 


Hgb  14.0 gm/dl (11.8-15.2)   09/23/21  15:15    


 


Hct  41.7 % (35.5-45.6)   09/23/21  15:15    


 


MCV  91 fl (84-94)   09/23/21  15:15    


 


MCH  31 pg (28-32)   09/23/21  15:15    


 


MCHC  34 % (32-34)   09/23/21  15:15    


 


RDW  15.8 % (13.2-15.2)  H  09/23/21  15:15    


 


Plt Count  126 K/mm3 (140-440)  L  09/23/21  15:15    


 


Lymph % (Auto)  22.4 % (13.4-35.0)   09/23/21  15:15    


 


Mono % (Auto)  10.9 % (0.0-7.3)  H  09/23/21  15:15    


 


Eos % (Auto)  2.1 % (0.0-4.3)   09/23/21  15:15    


 


Baso % (Auto)  0.5 % (0.0-1.8)   09/23/21  15:15    


 


Lymph # (Auto)  1.2 K/mm3 (1.2-5.4)   09/23/21  15:15    


 


Mono # (Auto)  0.6 K/mm3 (0.0-0.8)   09/23/21  15:15    


 


Eos # (Auto)  0.1 K/mm3 (0.0-0.4)   09/23/21  15:15    


 


Baso # (Auto)  0.0 K/mm3 (0.0-0.1)   09/23/21  15:15    


 


Seg Neutrophils %  64.1 % (40.0-70.0)   09/23/21  15:15    


 


Seg Neutrophils #  3.5 K/mm3 (1.8-7.7)   09/23/21  15:15    


 


Sodium  134 mmol/L (137-145)  L  09/23/21  20:18    


 


Potassium  4.3 mmol/L (3.6-5.0)   09/23/21  20:18    


 


Chloride  101.9 mmol/L ()   09/23/21  20:18    


 


Carbon Dioxide  21 mmol/L (22-30)  L  09/23/21  20:18    


 


Anion Gap  15 mmol/L  09/23/21  20:18    


 


BUN  15 mg/dL (9-20)   09/23/21  20:18    


 


Creatinine  0.6 mg/dL (0.8-1.3)  L  09/23/21  20:18    


 


Estimated GFR  > 60 ml/min  09/23/21  20:18    


 


BUN/Creatinine Ratio  25 %  09/23/21  20:18    


 


Glucose  232 mg/dL ()  H  09/23/21  20:18    


 


POC Glucose  210 mg/dL ()  H  09/25/21  07:49    


 


Hemoglobin A1c  6.9 % (4-6)  H  09/23/21  15:15    


 


Calcium  9.0 mg/dL (8.4-10.2)   09/23/21  20:18    


 


Urine Color  Yellow  (Yellow)   09/23/21  Unknown


 


Urine Turbidity  Slightly-cloudy  (Clear)   09/23/21  Unknown


 


Urine pH  6.0  (5.0-7.0)   09/23/21  Unknown


 


Ur Specific Gravity  1.024  (1.003-1.030)   09/23/21  Unknown


 


Urine Protein  30 mg/dl mg/dL (Negative)   09/23/21  Unknown


 


Urine Glucose (UA)  50 mg/dL (Negative)   09/23/21  Unknown


 


Urine Ketones  Neg mg/dL (Negative)   09/23/21  Unknown


 


Urine Blood  Neg  (Negative)   09/23/21  Unknown


 


Urine Nitrite  Neg  (Negative)   09/23/21  Unknown


 


Urine Bilirubin  Neg  (Negative)   09/23/21  Unknown


 


Urine Urobilinogen  4.0 mg/dL (<2.0)   09/23/21  Unknown


 


Ur Leukocyte Esterase  Neg  (Negative)   09/23/21  Unknown


 


Urine WBC (Auto)  3.0 /HPF (0.0-6.0)   09/23/21  Unknown


 


Urine RBC (Auto)  4.0 /HPF (0.0-6.0)   09/23/21  Unknown


 


Calcium Oxalate Crystal  Few   09/23/21  Unknown


 


Urine Mucus  1+ /HPF  09/23/21  Unknown


 


Urine Yeast (Budding)  1+ /HPF  09/23/21  Unknown


 


Urine Opiates Screen  Negative   09/23/21  Unknown


 


Urine Methadone Screen  Negative   09/23/21  Unknown


 


Ur Barbiturates Screen  Negative   09/23/21  Unknown


 


Ur Phencyclidine Scrn  Negative   09/23/21  Unknown


 


Ur Amphetamines Screen  Negative   09/23/21  Unknown


 


U Benzodiazepines Scrn  Negative   09/23/21  Unknown


 


Urine Cocaine Screen  Negative   09/23/21  Unknown


 


U Marijuana (THC) Screen  Negative   09/23/21  Unknown


 


Drugs of Abuse Note  Disclamer   09/23/21  Unknown











Microbiology: 


Microbiology





09/23/21 19:08   Peripheral/Venous   Blood Culture - Preliminary


                            NO GROWTH AFTER 24 HOURS


09/23/21 19:02   Peripheral/Venous   Blood Culture - Preliminary


                            NO GROWTH AFTER 24 HOURS


09/24/21 Unknown   Hip - Right   Wound Culture - Preliminary








[place]due/IV: 


                                        





Voiding Method                   Urinal











Active Medications





- Current Medications


Current Medications: 














Generic Name Dose Route Start Last Admin





  Trade Name Freq  PRN Reason Stop Dose Admin


 


Acetaminophen  650 mg  09/24/21 13:00  09/25/21 10:21





  Acetaminophen 325 Mg Tab  PO   650 mg





  Q4H PRN   Administration





  Pain MILD(1-3)/Fever >100.5/HA  


 


Albuterol  2.5 mg  09/24/21 09:00 





  Albuterol 2.5 Mg/3 Ml Nebu  IH  





  Q4HRT PRN  





  Shortness Of Breath  


 


Famotidine  20 mg  09/24/21 10:00  09/25/21 10:24





  Famotidine 20 Mg/2 Ml Inj  IV   Not Given





  BID [place]  


 


Heparin Sodium (Porcine)  5,000 unit  09/24/21 10:00  09/25/21 10:20





  Heparin 5,000 Unit/1 Ml Vial  SUB-Q   5,000 unit





  Q12HR [place]   Administration


 


Vancomycin HCl 1,500 mg/  530 mls @ 333.333 mls/hr  09/24/21 10:00  09/24/21 

23:07





  Sodium Chloride  IV   333.333 mls/hr





  Q12H [place]   Administration


 


Cefepime HCl  2 gm in 100 mls @ 200 mls/hr  09/24/21 14:00  09/25/21 02:26





  Cefepime/Ns 2 Gm/100 Ml  IV   200 mls/hr





  Q12H [place]   Administration





  Protocol  


 


Insulin Human Lispro  0 unit  09/24/21 11:30  09/25/21 10:20





  Insulin Lispro 100 Unit/Ml  SUB-Q   4 unit





  ACHS [place]   Administration





  Protocol  


 


Metoclopramide HCl  10 mg  09/24/21 00:20 





  Metoclopramide 10 Mg/2 Ml Inj  IV  





  Q6H PRN  





  Nausea And Vomiting  


 


Morphine Sulfate  2 mg  09/24/21 00:20  09/25/21 00:00





  Morphine 2 Mg/1 Ml Inj  IV   2 mg





  Q4H PRN   Administration





  Pain, Moderate (4-6)  


 


Ondansetron HCl  4 mg  09/24/21 13:00 





  Ondansetron 4 Mg/2 Ml Inj  IV  





  Q8H PRN  





  Nausea And Vomiting  


 


Sodium Chloride  10 ml  09/24/21 12:00  09/25/21 10:24





  Sodium Chloride 0.9% 10 Ml Flush Syringe  IV   Not Given





  BID [place]  


 


Sodium Chloride  10 ml  09/24/21 13:00 





  Sodium Chloride 0.9% 10 Ml Flush Syringe  IV  





  PRN PRN  





  LINE FLUSH  














Nutrition/Malnutrition Assess





- Dietary Evaluation


Nutrition/Malnutrition Findings: 


                                        





Nutrition Notes                                            Start:  09/25/21 10:3

9


Freq:                                                      Status: Active       




Protocol:                                                                       




 Document     09/25/21 10:39  CW  (Rec: 09/25/21 10:49  CW  [id number])


 Nutrition Notes


     Need for Assessment generated from:         RN Referral


     Initial or Follow up                        Assessment


     Current Diagnosis                           COPD,Hypertension


     Other Pertinent Diagnosis                   nonhealing surgical wound,


                                                 hepatitis, cirrhosis


     Current Diet                                Cardiac Diet


     Labs/Tests                                  Na 134


                                                 


                                                 A1c 6.9


     Pertinent Medications                       Humalog


     Height                                      5 ft 4 in


     Weight                                      68.039 kg


     Ideal Body Weight (kg)                      59.09


     BMI                                         25.7


     Weight change and time frame                weight moderately stable x 5


                                                 months


     Weight Status                               Appropriate


     Subjective/Other Information                RN screen for new onset DM. Pt


                                                 is not new onest DM. Will


                                                 change diet to consistent


                                                 carbohydrate diet. Pt has


                                                 nonhealing wound. Will provide


                                                 wound healing ONS. Pt is


                                                 incarcerated. Diet to be


                                                 managed by institution.


     Burn                                        Absent


     Trauma                                      Absent


     Skin Integrity/Comment                      nonhealing wound


     Minimum of two criteria                     No physical signs of


                                                 malnutrition


     #1


      Nutrition Diagnosis                        Increased nutrient needs   (


                                                 specify in comment below)


      Comments:                                  protein


      Etiology                                   wound healing


      As Evidenced by Signs and Symptoms         surgical wound present


     Is patient on ventilator?                   No


     Is Patient Ambulatory and/or Out of Bed     No


     REE-([place]-confined to bed)      [phone]


     Calculation Used for Recommendations        Allen Metcalf


     Additional Notes                            protien needs: 85 - 102g (1.25


                                                 - 1.5 g/kg)


                                                 fluid needs: 1 ml/kcal or per


                                                 MD


 Nutrition Intervention


     Change Diet Order:                          Change diet to Cardiac


                                                 Consistent Carbohydrate


     Add Supplement/Snack (indicate name/kcal    Kulwinder BID


      /protein )                                 


     Provides kCal:                              95


     Provides Protein (gm)                       5


     Goal #1                                     Wound healing


     Goal #2                                     Meet at least 75% of EER via


                                                 PO


     Anticipated Discharge Needs:                Cardiac Consistent Carbohydrat


                                                 diet high in protien with


                                                 Kulwinder (or equivalent BID x 2


                                                 weeks)


     Follow-Up By:                               09/27/21


     Additional Comments                         F/U for ONS tolerance

## 2021-09-26 NOTE — XRAY REPORT
RIGHT HUMERUS 2 VIEW(S)



INDICATION / CLINICAL INFORMATION: fall/ R arm pain 



COMPARISON: None available.

 

FINDINGS:



BONES / JOINT(S): No acute fracture or subluxation. Mild degenerative change of the right acromioclav
icular joint.



SOFT TISSUES: No significant abnormality.



ADDITIONAL FINDINGS: None.







Signer Name: Dirk Wren MD 

Signed: 9/26/2021 1:10 PM

Workstation Name: [id number]

## 2021-09-26 NOTE — PROGRESS NOTE
Assessment and Plan


Assessment and plan: 





(1) Postoperative abscess


Current Visit: Yes   Status: Acute   


Plan to address problem: 


Abscess in the right hip region at the postoperative site with possible 

recurrent fracture


IV Unasyn and IV vancomycin started


Orthopedic consult


May need revision surgery and abscess drainage


ID also consulted








(2) COPD (chronic obstructive pulmonary disease)


Current Visit: No   Status: Chronic   


Qualifiers: 


   COPD type: chronic bronchitis 


Plan to address problem: 


Nebulizer treatments as needed








(3) Hypertension


Current Visit: Yes   Status: Chronic   


Qualifiers: 


   Hypertension type: primary hypertension   Qualified Code(s): I10 - Essential 

(primary) hypertension   


Plan to address problem: 


Continue antihypertensives and adjust medications








(4) T2DM (type 2 diabetes mellitus)


Current Visit: Yes   Status: Chronic   


Qualifiers: 


   Diabetes mellitus long term insulin use: unspecified long term insulin use 

status 


Plan to address problem: 


Continue coverage and check hemoglobin A1c








(5) DVT prophylaxis


Current Visit: Yes   Status: Acute   


Plan to address problem: 


On heparin and GI prophylaxis





9/24


-Patient is admitted for right hip fracture, abscess on the right hip area


-Orthopedics and ID consulted


-Patient is currently on Unasyn and vancomycin


-Blood pressure controlled, blood sugars in target


-Continue current management and follow with orthopedics and ID.





9/25


-I&D of the abscess was done by orthopedics.  Recommend conservative management


-ID saw the patient and recommend to continue empiric cefepime and vancomycin


-Wound cultures pending


-Patient has severe pain and on Percocet


-PT evaluation





9/26


-Continue with empiric cefepime and vancomycin, follow with ID for discharge 

planning.  Blood cultures show no growth so far.





History


Interval history: 





Patient was seen and evaluated this morning


Patient is complaining of severe right hip pain





Hospitalist Physical





- Physical exam


Narrative exam: 





 Not in cardiopulmonary distress. 


 The patient appeared well nourished and normally developed.


 Vital signs as documented.


 Head exam is unremarkable.


 No scleral icterus .


 Neck is without jugular venous distension, thyromegaly, or carotid bruits. 


 Lungs are clear to auscultation.


Cardiac exam reveals regular rate and  Rhythm. 


Abdominal exam reveals normal bowel sounds, nontender, no organomegaly.


Extremities are nonedematous and both femoral and pedal pulses are normal.


CNS: Alert and oriented 3.  No focal weakness.





- Constitutional


Vitals: 


                                        











Temp Pulse Resp BP Pulse Ox


 


 98.8 F   93 H  20   136/70   96 


 


 09/26/21 08:01  09/26/21 08:01  09/26/21 08:01  09/26/21 08:01  09/26/21 08:01











General appearance: Present: no acute distress, well-nourished





Results





- Labs


CBC & Chem 7: 


                                 09/23/21 15:15





                                 09/23/21 20:18


Labs: 


                             Laboratory Last Values











WBC  5.5 K/mm3 (4.5-11.0)   09/23/21  15:15    


 


RBC  4.58 M/mm3 (3.65-5.03)   09/23/21  15:15    


 


Hgb  14.0 gm/dl (11.8-15.2)   09/23/21  15:15    


 


Hct  41.7 % (35.5-45.6)   09/23/21  15:15    


 


MCV  91 fl (84-94)   09/23/21  15:15    


 


MCH  31 pg (28-32)   09/23/21  15:15    


 


MCHC  34 % (32-34)   09/23/21  15:15    


 


RDW  15.8 % (13.2-15.2)  H  09/23/21  15:15    


 


Plt Count  126 K/mm3 (140-440)  L  09/23/21  15:15    


 


Lymph % (Auto)  22.4 % (13.4-35.0)   09/23/21  15:15    


 


Mono % (Auto)  10.9 % (0.0-7.3)  H  09/23/21  15:15    


 


Eos % (Auto)  2.1 % (0.0-4.3)   09/23/21  15:15    


 


Baso % (Auto)  0.5 % (0.0-1.8)   09/23/21  15:15    


 


Lymph # (Auto)  1.2 K/mm3 (1.2-5.4)   09/23/21  15:15    


 


Mono # (Auto)  0.6 K/mm3 (0.0-0.8)   09/23/21  15:15    


 


Eos # (Auto)  0.1 K/mm3 (0.0-0.4)   09/23/21  15:15    


 


Baso # (Auto)  0.0 K/mm3 (0.0-0.1)   09/23/21  15:15    


 


Seg Neutrophils %  64.1 % (40.0-70.0)   09/23/21  15:15    


 


Seg Neutrophils #  3.5 K/mm3 (1.8-7.7)   09/23/21  15:15    


 


Sodium  134 mmol/L (137-145)  L  09/23/21  20:18    


 


Potassium  4.3 mmol/L (3.6-5.0)   09/23/21  20:18    


 


Chloride  101.9 mmol/L ()   09/23/21  20:18    


 


Carbon Dioxide  21 mmol/L (22-30)  L  09/23/21  20:18    


 


Anion Gap  15 mmol/L  09/23/21  20:18    


 


BUN  15 mg/dL (9-20)   09/23/21  20:18    


 


Creatinine  0.6 mg/dL (0.8-1.3)  L  09/23/21  20:18    


 


Estimated GFR  > 60 ml/min  09/23/21  20:18    


 


BUN/Creatinine Ratio  25 %  09/23/21  20:18    


 


Glucose  232 mg/dL ()  H  09/23/21  20:18    


 


POC Glucose  179 mg/dL ()  H  09/26/21  07:38    


 


Hemoglobin A1c  6.9 % (4-6)  H  09/23/21  15:15    


 


Calcium  9.0 mg/dL (8.4-10.2)   09/23/21  20:18    


 


Urine Color  Yellow  (Yellow)   09/23/21  Unknown


 


Urine Turbidity  Slightly-cloudy  (Clear)   09/23/21  Unknown


 


Urine pH  6.0  (5.0-7.0)   09/23/21  Unknown


 


Ur Specific Gravity  1.024  (1.003-1.030)   09/23/21  Unknown


 


Urine Protein  30 mg/dl mg/dL (Negative)   09/23/21  Unknown


 


Urine Glucose (UA)  50 mg/dL (Negative)   09/23/21  Unknown


 


Urine Ketones  Neg mg/dL (Negative)   09/23/21  Unknown


 


Urine Blood  Neg  (Negative)   09/23/21  Unknown


 


Urine Nitrite  Neg  (Negative)   09/23/21  Unknown


 


Urine Bilirubin  Neg  (Negative)   09/23/21  Unknown


 


Urine Urobilinogen  4.0 mg/dL (<2.0)   09/23/21  Unknown


 


Ur Leukocyte Esterase  Neg  (Negative)   09/23/21  Unknown


 


Urine WBC (Auto)  3.0 /HPF (0.0-6.0)   09/23/21  Unknown


 


Urine RBC (Auto)  4.0 /HPF (0.0-6.0)   09/23/21  Unknown


 


Calcium Oxalate Crystal  Few   09/23/21  Unknown


 


Urine Mucus  1+ /HPF  09/23/21  Unknown


 


Urine Yeast (Budding)  1+ /HPF  09/23/21  Unknown


 


Vancomycin Trough  12.2 ug/mL (5.0-20.0)   09/25/21  21:10    


 


Urine Opiates Screen  Negative   09/23/21  Unknown


 


Urine Methadone Screen  Negative   09/23/21  Unknown


 


Ur Barbiturates Screen  Negative   09/23/21  Unknown


 


Ur Phencyclidine Scrn  Negative   09/23/21  Unknown


 


Ur Amphetamines Screen  Negative   09/23/21  Unknown


 


U Benzodiazepines Scrn  Negative   09/23/21  Unknown


 


Urine Cocaine Screen  Negative   09/23/21  Unknown


 


U Marijuana (THC) Screen  Negative   09/23/21  Unknown


 


Drugs of Abuse Note  Disclamer   09/23/21  Unknown











Microbiology: 


Microbiology





09/23/21 19:08   Peripheral/Venous   Blood Culture - Preliminary


                            NO GROWTH AFTER 48 HOURS


09/23/21 19:02   Peripheral/Venous   Blood Culture - Preliminary


                            NO GROWTH AFTER 48 HOURS








Perdue/IV: 


                                        





Voiding Method                   Urinal











Active Medications





- Current Medications


Current Medications: 














Generic Name Dose Route Start Last Admin





  Trade Name Freq  PRN Reason Stop Dose Admin


 


Acetaminophen  650 mg  09/24/21 13:00  09/25/21 10:21





  Acetaminophen 325 Mg Tab  PO   650 mg





  Q4H PRN   Administration





  Pain MILD(1-3)/Fever >100.5/HA  


 


Albuterol  2.5 mg  09/24/21 09:00 





  Albuterol 2.5 Mg/3 Ml Nebu  IH  





  Q4HRT PRN  





  Shortness Of Breath  


 


Famotidine  20 mg  09/24/21 10:00  09/25/21 21:47





  Famotidine 20 Mg/2 Ml Inj  IV   20 mg





  BID ISRAEL   Administration


 


Heparin Sodium (Porcine)  5,000 unit  09/24/21 10:00  09/25/21 21:47





  Heparin 5,000 Unit/1 Ml Vial  SUB-Q   5,000 unit





  Q12HR ISRAEL   Administration


 


Vancomycin HCl 1,500 mg/  530 mls @ 333.333 mls/hr  09/24/21 10:00  09/25/21 

21:47





  Sodium Chloride  IV   333.333 mls/hr





  Q12H ISRAEL   Administration


 


Cefepime HCl  2 gm in 100 mls @ 200 mls/hr  09/24/21 14:00  09/26/21 02:12





  Cefepime/Ns 2 Gm/100 Ml  IV   200 mls/hr





  Q12H ISRAEL   Administration





  Protocol  


 


Insulin Human Lispro  0 unit  09/24/21 11:30  09/25/21 21:58





  Insulin Lispro 100 Unit/Ml  SUB-Q   4 unit





  ACHS ISRAEL   Administration





  Protocol  


 


Metoclopramide HCl  10 mg  09/24/21 00:20 





  Metoclopramide 10 Mg/2 Ml Inj  IV  





  Q6H PRN  





  Nausea And Vomiting  


 


Morphine Sulfate  2 mg  09/24/21 00:20  09/25/21 12:55





  Morphine 2 Mg/1 Ml Inj  IV   2 mg





  Q4H PRN   Administration





  Pain, Moderate (4-6)  


 


Ondansetron HCl  4 mg  09/24/21 13:00 





  Ondansetron 4 Mg/2 Ml Inj  IV  





  Q8H PRN  





  Nausea And Vomiting  


 


Oxycodone/Acetaminophen  2 tab  09/25/21 11:19  09/25/21 21:50





  Oxycodone /Acetaminophen 5-325mg Tab  PO   1 tab





  Q4H PRN   Administration





  Pain, Moderate (4-6)  


 


Sodium Chloride  10 ml  09/24/21 12:00  09/25/21 21:52





  Sodium Chloride 0.9% 10 Ml Flush Syringe  IV   10 ml





  BID IS[place]   Administration


 


Sodium Chloride  10 ml  09/24/21 13:00 





  Sodium Chloride 0.9% 10 Ml Flush Syringe  IV  





  PRN PRN  





  LINE FLUSH  














Nutrition/Malnutrition Assess





- Dietary Evaluation


Nutrition/Malnutrition Findings: 


                                        





Nutrition Notes                                            Start:  09/25/21 

10:39


Freq:                                                      Status: Active       




Protocol:                                                                       




 Document     09/25/21 10:39  CW  (Rec: 09/25/21 10:49    [id number])


 Nutrition Notes


     Need for Assessment generated from:         RN Referral


     Initial or Follow up                        Assessment


     Current Diagnosis                           COPD,Hypertension


     Other Pertinent Diagnosis                   nonhealing surgical wound,


                                                 hepatitis, cirrhosis


     Current Diet                                Cardiac Diet


     Labs/Tests                                  Na 134


                                                 


                                                 A1c 6.9


     Pertinent Medications                       Humalog


     Height                                      5 ft 4 in


     Weight                                      68.039 kg


     Ideal Body Weight (kg)                      59.09


     BMI                                         25.7


     Weight change and time frame                weight moderately stable x 5


                                                 months


     Weight Status                               Appropriate


     Subjective/Other Information                RN screen for new onset DM. Pt


                                                 is not new onest DM. Will


                                                 change diet to consistent


                                                 carbohydrate diet. Pt has


                                                 nonhealing wound. Will provide


                                                 wound healing ONS. Pt is


                                                 incarcerated. Diet to be


                                                 managed by institution.


     Burn                                        Absent


     Trauma                                      Absent


     Skin Integrity/Comment                      nonhealing wound


     Minimum of two criteria                     No physical signs of


                                                 malnutrition


     #1


      Nutrition Diagnosis                        Increased nutrient needs   (


                                                 specify in comment below)


      Comments:                                  protein


      Etiology                                   wound healing


      As Evidenced by Signs and Symptoms         surgical wound present


     Is patient on ventilator?                   No


     Is Patient Ambulatory and/or Out of Bed     No


     REE-([facility]-confined to bed)      3485.832


     Calculation Used for Recommendations        [facility]


     Additional Notes                            protien needs: 85 - 102g (1.25


                                                 - 1.5 g/kg)


                                                 fluid needs: 1 ml/kcal or per


                                                 MD


 Nutrition Intervention


     Change Diet Order:                          Change diet to Cardiac


                                                 Consistent Carbohydrate


     Add Supplement/Snack (indicate name/kcal    Kulwinder BID


      /protein )                                 


     Provides kCal:                              95


     Provides Protein (gm)                       5


     Goal #1                                     Wound healing


     Goal #2                                     Meet at least 75% of EER via


                                                 PO


     Anticipated Discharge Needs:                Cardiac Consistent Carbohydrat


                                                 diet high in protien with


                                                 Kulwinder (or equivalent BID x 2


                                                 weeks)


     Follow-Up By:                               09/27/21


     Additional Comments                         F/U for ONS tolerance

## 2021-09-27 NOTE — PROGRESS NOTE
Assessment and Plan





right hip infection, s/p THR, will explore wound and debride soft tissues in 

OR..





Subjective


Date of service: 09/27/21


Interval history: 





Still draining from right hip wound, C/S done show both fungal and gram neg 

organisms...





Objective


Vital signs: 


                               Vital Signs - 12hr











  09/27/21 09/27/21





  03:57 08:00


 


Temperature 98.2 F 99.5 F


 


Pulse Rate 94 H 92 H


 


Respiratory 18 20





Rate  


 


Blood Pressure 135/67 128/78


 


O2 Sat by Pulse 91 96





Oximetry  














- Labs


CBC & BMP: 


                                 09/23/21 15:15





                                 09/26/21 18:07


Labs: 


                              Abnormal lab results











  09/26/21 09/26/21 09/26/21 Range/Units





  15:50 18:07 20:10 


 


Sodium   136 L   (137-145)  mmol/L


 


Creatinine   0.5 L   (0.8-1.3)  mg/dL


 


Glucose   205 H   ()  mg/dL


 


POC Glucose  150 H   164 H  ()  mg/dL














  09/27/21 09/27/21 Range/Units





  07:58 13:04 


 


Sodium    (137-145)  mmol/L


 


Creatinine    (0.8-1.3)  mg/dL


 


Glucose    ()  mg/dL


 


POC Glucose  225 H  179 H  ()  mg/dL

## 2021-09-27 NOTE — PROGRESS NOTE
Assessment and Plan


Cultures:


9/23/2021 blood culture: no growth


9/24/2021 wound culture: Candida albicans, GNR





A/P:


61-year-old male with COPD, nicotine dependence, alcohol abuse, cirrhosis, 

currently incarcerated, recently hospitalized with right hip fracture and 

underwent right DIMPLE on 8/12/2021:





#Surgical site infection, right hip prosthetic joint infection, periprosthetic 

fracture and concern for abscess: Hip x-ray revealed a periprosthetic fracture, 

CT revealed a periprosthetic fracture and a complex collection tracking along 

the lateral aspect of the proximal femur with a sinus tract extending to the sub

cutaneous tissues concerning for abscess formation. 





Patient was evaluated by orthopedic surgery, who is of the opinion that there is

no evidence of infection.  There is small area of hyper granulation tissue with 

sinus tract.  No plans for any surgical intervention.





Recs:


-culture growing GNR, continue Cefepime


-positive cultures, sinus tract extending to the subcutaneous tissues, recommend

re-evaluation by orthopedics. Cannot be cured with antimicrobials alone





d/w Dr. Mirela Hawkins MD, FACP


[facility]


O: [phone]


F: [phone]








Subjective


Date of service: 09/27/21


Interval history: 





No fever. Was seen by orthopedics, no surgical intervention planned. 





Objective





- Exam


Narrative Exam: 





Physical Exam: 


Constitutional: Alert, cooperative. No acute distress


Head, Ears, Nose: Normocephalic, atraumatic. External ears, nose normal


Eyes: Conjunctivae/corneas clear. No icterus. No ptosis.


Neck: Supple, no meningeal signs


Oral: Poor dentition


Cardiovascular: S1, S2 +


Respiratory: Good air entry, clear to auscultation bilaterally


GI: Soft, non-tender; bowel sounds normal. No peritoneal signs


Musculoskeletal: Right hip incision noted, dressing present


Skin: No rash or abscess


Hem/Lymphatic: No palpable cervical or supraclavicular nodes. No lymphangitis


Psych: Mood ok. Affect normal


Neurological: Awake, alert, oriented. No gross abnormality 





- Constitutional


Vitals: 


                                   Vital Signs











Temp Pulse Resp BP Pulse Ox


 


 99.5 F   92 H  20   128/78   96 


 


 09/27/21 08:00  09/27/21 08:00  09/27/21 08:00  09/27/21 08:00  09/27/21 08:00








                           Temperature -Last 24 Hours











Temperature                    99.5 F


 


Temperature                    98.2 F


 


Temperature                    98.3 F


 


Temperature                    98.8 F


 


Temperature                    98.3 F


 


Temperature                    98.3 F

















- Labs


CBC & Chem 7: 


                                 09/23/21 15:15





                                 09/26/21 18:07


Labs: 


                              Abnormal lab results











  09/26/21 09/26/21 09/26/21 Range/Units





  12:46 15:50 18:07 


 


Sodium    136 L  (137-145)  mmol/L


 


Creatinine    0.5 L  (0.8-1.3)  mg/dL


 


Glucose    205 H  ()  mg/dL


 


POC Glucose  211 H  150 H   ()  mg/dL














  09/26/21 09/27/21 Range/Units





  20:10 07:58 


 


Sodium    (137-145)  mmol/L


 


Creatinine    (0.8-1.3)  mg/dL


 


Glucose    ()  mg/dL


 


POC Glucose  164 H  225 H  ()  mg/dL

## 2021-09-27 NOTE — PROGRESS NOTE
Assessment and Plan


Assessment and plan: 


61-year-old male with history of EtOH dependence, hepatitis/cirrhosis, diabetes 

and COPD had total right hip replacement on 8/12/2021 and [address] [place] [address].  Patient was doing well for couple of weeks.  Patient has been 

incarcerated for the last 13 months.  Over the last couple weeks patient has 





Previous admission in August 1 week


Patient is 61 yo  Male with COPD, Nicotine Dependence, ETOH Dependence 

complicated by Cirrhosis, EDWAR, Mild Intermittent Asthma presents to ED for 

evaluation following a fall.  The patient was seen and evaluated in the 

emergency department. Patient found to have right hip fracture, as well as 

metabolic acidosis.  Patient admitted to surgical floor.  Orthopedic surgery 

service consulted.


Right hip fracture with right total hip replacement on 8/12/2021





cultures growing GNR and Candida








- Imaging and Cardiology


Imaging and Cardiology: 





Lower extremity CAT scan


Periprosthetic fracture centered over the tip of the femoral stem component


Complex collection tracking along the lateral border of the proximal femur as 

outlined above with sinus tract extending to the subcutaneous tissues along the 

lateral proximal thigh


Findings are worrisome for abscess formation








(1) Postoperative abscess


Current Visit: Yes   Status: Acute   


Plan to address problem: 


Abscess in the right hip region at the postoperative site with possible recu

rrent fracture


IV Unasyn and IV vancomycin started


Orthopedic consult


May need revision surgery and abscess drainage


ID also consulted








(2) COPD (chronic obstructive pulmonary disease)


Current Visit: No   Status: Chronic   


Qualifiers: 


   COPD type: chronic bronchitis 


Plan to address problem: 


Nebulizer treatments as needed








(3) Hypertension


Current Visit: Yes   Status: Chronic   


Qualifiers: 


   Hypertension type: primary hypertension   Qualified Code(s): I10 - Essential 

(primary) hypertension   


Plan to address problem: 


Continue antihypertensives and adjust medications








(4) T2DM (type 2 diabetes mellitus)


Current Visit: Yes   Status: Chronic   


Qualifiers: 


   Diabetes mellitus long term insulin use: unspecified long term insulin use 

status 


Plan to address problem: 


Continue coverage and check hemoglobin A1c








(5) DVT prophylaxis


Current Visit: Yes   Status: Acute   


Plan to address problem: 


On heparin and GI prophylaxis





9/24


-Patient is admitted for right hip fracture, abscess on the right hip area


-Orthopedics and ID consulted


-Patient is currently on Unasyn and vancomycin


-Blood pressure controlled, blood sugars in target


-Continue current management and follow with orthopedics and ID.





9/25


-I&D of the abscess was done by orthopedics.  Recommend conservative management


-ID saw the patient and recommend to continue empiric cefepime and vancomycin


-Wound cultures pending


-Patient has severe pain and on Percocet


-PT evaluation





9/26


-Continue with empiric cefepime and vancomycin, follow with ID for discharge pl

anning.  Blood cultures show no growth so far.





9/27: Patient seen and examined resting comfortably.  Surgical site infection 

with complex collection tracking along the lateral aspect of proximal femur with

sinus tract extending to the subcutaneous tissue concerning for abscess 

formation which has been I indeed.  Cultures growing gram-negative marcellus we will 

continue cefepime but infectious disease.  Also for recommendation that Ortho 

should reevaluate the patient as this cannot be cured by antibiotics alone.  

Patient is a ball of the state and remains in police custody at this time.  

Dressing is intact no overt drainage noted














History


Interval history: 


Patient seen and examined resting comfortable, awaiting cultures.








Hospitalist Physical





- Physical exam


Narrative exam: 


 Not in cardiopulmonary distress. 


 The patient appeared well nourished and normally developed.


 Vital signs as documented.


 Head exam is unremarkable.


 No scleral icterus .


 Neck is without jugular venous distension, thyromegaly, or carotid bruits. 


 Lungs are clear to auscultation.


Cardiac exam reveals regular rate and  Rhythm. 


Abdominal exam reveals normal bowel sounds, nontender, no organomegaly.


Extremities are nonedematous and both femoral and pedal pulses are normal.  

Dressing noted around the right hip joint with no overt drainage


CNS: Alert and oriented 3.  No focal weakness.








- Constitutional


Vitals: 


                                        











Temp Pulse Resp BP Pulse Ox


 


 99.5 F   92 H  20   128/78   96 


 


 09/27/21 08:00  09/27/21 08:00  09/27/21 08:00  09/27/21 08:00  09/27/21 08:00











General appearance: Present: no acute distress, well-nourished





Results





- Labs


CBC & Chem 7: 


                                 09/23/21 15:15





                                 09/26/21 18:07


Labs: 


                             Laboratory Last Values











WBC  5.5 K/mm3 (4.5-11.0)   09/23/21  15:15    


 


RBC  4.58 M/mm3 (3.65-5.03)   09/23/21  15:15    


 


Hgb  14.0 gm/dl (11.8-15.2)   09/23/21  15:15    


 


Hct  41.7 % (35.5-45.6)   09/23/21  15:15    


 


MCV  91 fl (84-94)   09/23/21  15:15    


 


MCH  31 pg (28-32)   09/23/21  15:15    


 


MCHC  34 % (32-34)   09/23/21  15:15    


 


RDW  15.8 % (13.2-15.2)  H  09/23/21  15:15    


 


Plt Count  126 K/mm3 (140-440)  L  09/23/21  15:15    


 


Lymph % (Auto)  22.4 % (13.4-35.0)   09/23/21  15:15    


 


Mono % (Auto)  10.9 % (0.0-7.3)  H  09/23/21  15:15    


 


Eos % (Auto)  2.1 % (0.0-4.3)   09/23/21  15:15    


 


Baso % (Auto)  0.5 % (0.0-1.8)   09/23/21  15:15    


 


Lymph # (Auto)  1.2 K/mm3 (1.2-5.4)   09/23/21  15:15    


 


Mono # (Auto)  0.6 K/mm3 (0.0-0.8)   09/23/21  15:15    


 


Eos # (Auto)  0.1 K/mm3 (0.0-0.4)   09/23/21  15:15    


 


Baso # (Auto)  0.0 K/mm3 (0.0-0.1)   09/23/21  15:15    


 


Seg Neutrophils %  64.1 % (40.0-70.0)   09/23/21  15:15    


 


Seg Neutrophils #  3.5 K/mm3 (1.8-7.7)   09/23/21  15:15    


 


Sodium  136 mmol/L (137-145)  L  09/26/21  18:07    


 


Potassium  4.3 mmol/L (3.6-5.0)   09/26/21  18:07    


 


Chloride  102.0 mmol/L ()   09/26/21  18:07    


 


Carbon Dioxide  25 mmol/L (22-30)   09/26/21  18:07    


 


Anion Gap  13 mmol/L  09/26/21  18:07    


 


BUN  16 mg/dL (9-20)   09/26/21  18:07    


 


Creatinine  0.5 mg/dL (0.8-1.3)  L  09/26/21  18:07    


 


Estimated GFR  > 60 ml/min  09/26/21  18:07    


 


BUN/Creatinine Ratio  32 %  09/26/21  18:07    


 


Glucose  205 mg/dL ()  H  09/26/21  18:07    


 


POC Glucose  179 mg/dL ()  H  09/27/21  13:04    


 


Hemoglobin A1c  6.9 % (4-6)  H  09/23/21  15:15    


 


Calcium  8.8 mg/dL (8.4-10.2)   09/26/21  18:07    


 


Urine Color  Yellow  (Yellow)   09/23/21  Unknown


 


Urine Turbidity  Slightly-cloudy  (Clear)   09/23/21  Unknown


 


Urine pH  6.0  (5.0-7.0)   09/23/21  Unknown


 


Ur Specific Gravity  1.024  (1.003-1.030)   09/23/21  Unknown


 


Urine Protein  30 mg/dl mg/dL (Negative)   09/23/21  Unknown


 


Urine Glucose (UA)  50 mg/dL (Negative)   09/23/21  Unknown


 


Urine Ketones  Neg mg/dL (Negative)   09/23/21  Unknown


 


Urine Blood  Neg  (Negative)   09/23/21  Unknown


 


Urine Nitrite  Neg  (Negative)   09/23/21  Unknown


 


Urine Bilirubin  Neg  (Negative)   09/23/21  Unknown


 


Urine Urobilinogen  4.0 mg/dL (<2.0)   09/23/21  Unknown


 


Ur Leukocyte Esterase  Neg  (Negative)   09/23/21  Unknown


 


Urine WBC (Auto)  3.0 /HPF (0.0-6.0)   09/23/21  Unknown


 


Urine RBC (Auto)  4.0 /HPF (0.0-6.0)   09/23/21  Unknown


 


Calcium Oxalate Crystal  Few   09/23/21  Unknown


 


Urine Mucus  1+ /HPF  09/23/21  Unknown


 


Urine Yeast (Budding)  1+ /HPF  09/23/21  Unknown


 


Vancomycin Trough  12.2 ug/mL (5.0-20.0)   09/25/21  21:10    


 


Urine Opiates Screen  Negative   09/23/21  Unknown


 


Urine Methadone Screen  Negative   09/23/21  Unknown


 


Ur Barbiturates Screen  Negative   09/23/21  Unknown


 


Ur Phencyclidine Scrn  Negative   09/23/21  Unknown


 


Ur Amphetamines Screen  Negative   09/23/21  Unknown


 


U Benzodiazepines Scrn  Negative   09/23/21  Unknown


 


Urine Cocaine Screen  Negative   09/23/21  Unknown


 


U Marijuana (THC) Screen  Negative   09/23/21  Unknown


 


Drugs of Abuse Note  Disclamer   09/23/21  Unknown











Microbiology: 


Microbiology





09/24/21 Unknown   Hip - Right   Wound Culture - Preliminary


                                Portia Albicans


                                Gram Negative Marcellus


09/23/21 19:02   Peripheral/Venous   Blood Culture - Preliminary


                            NO GROWTH AFTER 72 HOURS


09/23/21 19:08   Peripheral/Venous   Blood Culture - Preliminary


                            NO GROWTH AFTER 72 HOURS








Perdue/IV: 


                                        





Voiding Method                   Urinal











Active Medications





- Current Medications


Current Medications: 














Generic Name Dose Route Start Last Admin





  Trade Name Freq  PRN Reason Stop Dose Admin


 


Acetaminophen  650 mg  09/24/21 13:00  09/25/21 10:21





  Acetaminophen 325 Mg Tab  PO   650 mg





  Q4H PRN   Administration





  Pain MILD(1-3)/Fever >100.5/HA  


 


Albuterol  2.5 mg  09/24/21 09:00 





  Albuterol 2.5 Mg/3 Ml Nebu  IH  





  Q4HRT PRN  





  Shortness Of Breath  


 


Doxepin HCl  50 mg  09/26/21 22:00  09/26/21 22:01





  Doxepin 25 Mg Cap  PO   50 mg





  [facility] [place]   Administration


 


Famotidine  20 mg  09/24/21 10:00  09/27/21 10:45





  Famotidine 20 Mg/2 Ml Inj  IV   20 mg





  BID [place]   Administration


 


Heparin Sodium (Porcine)  5,000 unit  09/24/21 10:00  09/27/21 10:43





  Heparin 5,000 Unit/1 Ml Vial  SUB-Q   5,000 unit





  Q12HR [place]   Administration


 


Cefepime HCl  2 gm in 100 mls @ 200 mls/hr  09/27/21 14:00 





  Cefepime/Ns 2 Gm/100 Ml  IV  





  Q12HR [place]  





  Protocol  


 


Insulin Glargine  10 units  09/26/21 22:00  09/26/21 21:59





  Insulin Glargine 100 Units/Ml  SUB-Q   10 units





  [facility] [place]   Administration


 


Insulin Human Lispro  0 unit  09/24/21 11:30  09/27/21 10:40





  Insulin Lispro 100 Unit/Ml  SUB-Q   4 unit





  [facility] [place]   Administration





  Protocol  


 


Metoclopramide HCl  10 mg  09/24/21 00:20 





  Metoclopramide 10 Mg/2 Ml Inj  IV  





  Q6H PRN  





  Nausea And Vomiting  


 


Morphine Sulfate  2 mg  09/24/21 00:20  09/25/21 12:55





  Morphine 2 Mg/1 Ml Inj  IV   2 mg





  Q4H PRN   Administration





  Pain, Moderate (4-6)  


 


Ondansetron HCl  4 mg  09/24/21 13:00 





  Ondansetron 4 Mg/2 Ml Inj  IV  





  Q8H PRN  





  Nausea And Vomiting  


 


Oxycodone/Acetaminophen  2 tab  09/25/21 11:19  09/27/21 10:42





  Oxycodone /Acetaminophen 5-325mg Tab  PO   2 tab





  Q4H PRN   Administration





  Pain, Moderate (4-6)  


 


Sodium Chloride  10 ml  09/24/21 12:00  09/27/21 10:43





  Sodium Chloride 0.9% 10 Ml Flush Syringe  IV   10 ml





  BID [place]   Administration


 


Sodium Chloride  10 ml  09/24/21 13:00 





  Sodium Chloride 0.9% 10 Ml Flush Syringe  IV  





  PRN PRN  





  LINE FLUSH  














Nutrition/Malnutrition Assess





- Dietary Evaluation


Nutrition/Malnutrition Findings: 


                                        





Nutrition Notes                                            Start:  09/25/21 

10:39


Freq:                                                      Status: Active       




Protocol:                                                                       




 Document     09/25/21 10:39  CW  (Rec: 09/25/21 10:49  CW  [id number])


 Nutrition Notes


     Need for Assessment generated from:         RN Referral


     Initial or Follow up                        Assessment


     Current Diagnosis                           COPD,Hypertension


     Other Pertinent Diagnosis                   nonhealing surgical wound,


                                                 hepatitis, cirrhosis


     Current Diet                                Cardiac Diet


     Labs/Tests                                  Na 134


                                                 


                                                 A1c 6.9


     Pertinent Medications                       Humalog


     Height                                      5 ft 4 in


     Weight                                      68.039 kg


     Ideal Body Weight (kg)                      59.09


     BMI                                         25.7


     Weight change and time frame                weight moderately stable x 5


                                                 months


     Weight Status                               Appropriate


     Subjective/Other Information                RN screen for new onset DM. Pt


                                                 is not new onest DM. Will


                                                 change diet to consistent


                                                 carbohydrate diet. Pt has


                                                 nonhealing wound. Will provide


                                                 wound healing ONS. Pt is


                                                 incarcerated. Diet to be


                                                 managed by institution.


     Burn                                        Absent


     Trauma                                      Absent


     Skin Integrity/Comment                      nonhealing wound


     Minimum of two criteria                     No physical signs of


                                                 malnutrition


     #1


      Nutrition Diagnosis                        Increased nutrient needs   (


                                                 specify in comment below)


      Comments:                                  protein


      Etiology                                   wound healing


      As Evidenced by Signs and Symptoms         surgical wound present


     Is patient on ventilator?                   No


     Is Patient Ambulatory and/or Out of Bed     No


     REE-([place]-St. Jeor-confined to bed)      [phone]


     Calculation Used for Recommendations        [place]-St Jeor


     Additional Notes                            protien needs: 85 - 102g (1.25


                                                 - 1.5 g/kg)


                                                 fluid needs: 1 ml/kcal or per


                                                 MD


 Nutrition Intervention


     Change Diet Order:                          Change diet to Cardiac


                                                 Consistent Carbohydrate


     Add Supplement/Snack (indicate name/kcal    Kulwinder BID


      /protein )                                 


     Provides kCal:                              95


     Provides Protein (gm)                       5


     Goal #1                                     Wound healing


     Goal #2                                     Meet at least 75% of EER via


                                                 PO


     Anticipated Discharge Needs:                Cardiac Consistent Carbohydrat


                                                 diet high in protien with


                                                 Kulwinder (or equivalent BID x 2


                                                 weeks)


     Follow-Up By:                               09/27/21


     Additional Comments                         F/U for ONS tolerance

## 2021-09-28 NOTE — PROGRESS NOTE
Assessment and Plan


Cultures:


9/23/2021 blood culture: no growth


9/24/2021 wound culture: Candida albicans, GNR





A/P:


61-year-old male with COPD, nicotine dependence, alcohol abuse, cirrhosis, 

currently incarcerated, recently hospitalized with right hip fracture and 

underwent right DIMPLE on 8/12/2021:





#Surgical site infection, right hip prosthetic joint infection, periprosthetic 

fracture and concern for abscess: Hip x-ray revealed a periprosthetic fracture, 

CT revealed a periprosthetic fracture and a complex collection tracking along 

the lateral aspect of the proximal femur with a sinus tract extending to the 

subcutaneous tissues concerning for abscess formation. This meets major MSIS 

criteria for prosthetic joint infection. 





Recs:


-culture growing GNR, continue IV Cefepime. Candida may reflect superficial 

colonization, hold off on antifungal coverage, unless deeper cultures also grow 

it


-given positive cultures, sinus tract, likely to need revision arthroplasty, 

possibly 2 staged. Defer surgical plan to orthopedics. Please send multiple deep

cultures for bacterial and fungal cultures








Kacy Hawkins MD, FACP


[facility] ([facility])


O: [phone]


F: [phone]








Subjective


Date of service: 09/28/21


Interval history: 





Low grade temperature. No complaints. Lying in bed. 





Objective





- Exam


Narrative Exam: 





Physical Exam: 


Constitutional: Alert, cooperative. No acute distress


Head, Ears, Nose: Normocephalic, atraumatic. External ears, nose normal


Eyes: Conjunctivae/corneas clear. No icterus. No ptosis.


Neck: Supple, no meningeal signs


Oral: Poor dentition


Cardiovascular: S1, S2 +


Respiratory: Good air entry, clear to auscultation bilaterally


GI: Soft, non-tender; bowel sounds normal. No peritoneal signs


Musculoskeletal: Right hip incision noted, dressing present


Skin: No rash or abscess


Hem/Lymphatic: No palpable cervical or supraclavicular nodes. No lymphangitis


Psych: Mood ok. Affect normal


Neurological: Awake, alert, oriented. No gross abnormality  





- Constitutional


Vitals: 


                                   Vital Signs











Temp Pulse Resp BP Pulse Ox


 


 98.2 F   93 H  20   146/74   97 


 


 09/28/21 03:41  09/28/21 07:21  09/28/21 07:21  09/28/21 07:21  09/28/21 07:21








                           Temperature -Last 24 Hours











Temperature                    98.2 F


 


Temperature                    98.1 F


 


Temperature                    100.7 F


 


Temperature                    97.5 F

















- Labs


CBC & Chem 7: 


                                 09/23/21 15:15





                                 09/26/21 18:07


Labs: 


                              Abnormal lab results











  09/27/21 09/27/21 09/28/21 Range/Units





  16:11 20:16 09:01 


 


POC Glucose  244 H  156 H  203 H  ()  mg/dL














  09/28/21 Range/Units





  12:04 


 


POC Glucose  315 H  ()  mg/dL

## 2021-09-28 NOTE — PROGRESS NOTE
Assessment and Plan


Assessment and plan: 


61-year-old male with history of EtOH dependence, hepatitis/cirrhosis, diabetes 

and COPD had total right hip replacement on 2021 and [address] [place] [address].  Patient was doing well for couple of weeks.  Patient has been 

incarcerated for the last 13 months.  Over the last couple weeks patient has 





Previous admission in August 1 week


Patient is 61 yo  Male with COPD, Nicotine Dependence, ETOH Dependence 

complicated by Cirrhosis, EDWAR, Mild Intermittent Asthma presents to ED for 

evaluation following a fall.  The patient was seen and evaluated in the 

emergency department. Patient found to have right hip fracture, as well as 

metabolic acidosis.  Patient admitted to surgical floor.  Orthopedic surgery 

service consulted.


Right hip fracture with right total hip replacement on 2021





cultures growing GNR and Candida





- Imaging and Cardiology


Imaging and Cardiology: 





Lower extremity CAT scan


Periprosthetic fracture centered over the tip of the femoral stem component


Complex collection tracking along the lateral border of the proximal femur as 

outlined above with sinus tract extending to the subcutaneous tissues along the 

lateral proximal thigh


Findings are worrisome for abscess formation








(1) Postoperative abscess


Current Visit: Yes   Status: Acute   


Plan to address problem: 


Abscess in the right hip region at the postoperative site with possible recur

rent fracture


IV Unasyn and IV vancomycin started


Orthopedic consult


May need revision surgery and abscess drainage


ID also consulted





(2) COPD (chronic obstructive pulmonary disease)


Current Visit: No   Status: Chronic   


Qualifiers: 


   COPD type: chronic bronchitis 


Plan to address problem: 


Nebulizer treatments as needed








(3) Hypertension


Current Visit: Yes   Status: Chronic   


Qualifiers: 


   Hypertension type: primary hypertension   Qualified Code(s): I10 - Essential 

(primary) hypertension   


Plan to address problem: 


Continue antihypertensives and adjust medications








(4) T2DM (type 2 diabetes mellitus)


Current Visit: Yes   Status: Chronic   


Qualifiers: 


   Diabetes mellitus long term insulin use: unspecified long term insulin use 

status 


Plan to address problem: 


Continue coverage and check hemoglobin A1c








(5) DVT prophylaxis


Current Visit: Yes   Status: Acute   


Plan to address problem: 


On heparin and GI prophylaxis








-Patient is admitted for right hip fracture, abscess on the right hip area


-Orthopedics and ID consulted


-Patient is currently on Unasyn and vancomycin


-Blood pressure controlled, blood sugars in target


-Continue current management and follow with orthopedics and ID.








-I&D of the abscess was done by orthopedics.  Recommend conservative management


-ID saw the patient and recommend to continue empiric cefepime and vancomycin


-Wound cultures pending


-Patient has severe pain and on Percocet


-PT evaluation








-Continue with empiric cefepime and vancomycin, Follow with ID for discharge 

planning.  Blood cultures show no growth so far.





: Patient seen and examined resting comfortably.  Surgical site infection 

with complex collection tracking along the lateral aspect of proximal femur with

sinus tract extending to the subcutaneous tissue concerning for abscess 

formation which has been I indeed.  Cultures growing gram-negative marcellus we will 

continue Cefepime but infectious disease.  Also for recommendation that Ortho 

should reevaluate the patient as this cannot be cured by antibiotics alone.  

Patient is a ball of the state and remains in police custody at this time.  

Dressing is intact no overt drainage noted





: Continue supportive care.  Awaiting surgical reevaluation.  The patient 

tells me that the surgeon said they may need to do a hip replacement.  I will 

also reach out to the surgeon for now continue treatment with antibiotics.  

Discussed with the officer and bedside and also with the patient.  Continue with

management PT OT





History


Interval history: 


Patient seen and examined resting comfortable. Awaiting surgical re-evaluation.





Hospitalist Physical





- Physical exam


Narrative exam: 


 Not in cardiopulmonary distress. 


 The patient appeared well nourished and normally developed.


 Vital signs as documented.


 Head exam is unremarkable.


 No scleral icterus .


 Neck is without jugular venous distension, thyromegaly, or carotid bruits. 


 Lungs are clear to auscultation.


Cardiac exam reveals regular rate and  Rhythm. 


Abdominal exam reveals normal bowel sounds, nontender, no organomegaly.


Extremities are nonedematous and both femoral and pedal pulses are normal.  

Dressing noted around the right hip joint with no overt drainage


CNS: Alert and oriented 3.  No focal weakness.








- Constitutional


Vitals: 


                                        











Temp Pulse Resp BP Pulse Ox


 


 98.2 F   93 H  20   146/74   97 


 


 21 03:41  21 07:21  21 07:21  21 07:21  21 07:21











General appearance: Present: no acute distress, well-nourished





Results





- Labs


CBC & Chem 7: 


                                 21 15:15





                                 21 18:07


Labs: 


                             Laboratory Last Values











WBC  5.5 K/mm3 (4.5-11.0)   21  15:15    


 


RBC  4.58 M/mm3 (3.65-5.03)   21  15:15    


 


Hgb  14.0 gm/dl (11.8-15.2)   21  15:15    


 


Hct  41.7 % (35.5-45.6)   21  15:15    


 


MCV  91 fl (84-94)   21  15:15    


 


MCH  31 pg (28-32)   21  15:15    


 


MCHC  34 % (32-34)   21  15:15    


 


RDW  15.8 % (13.2-15.2)  H  21  15:15    


 


Plt Count  126 K/mm3 (140-440)  L  21  15:15    


 


Lymph % (Auto)  22.4 % (13.4-35.0)   21  15:15    


 


Mono % (Auto)  10.9 % (0.0-7.3)  H  21  15:15    


 


Eos % (Auto)  2.1 % (0.0-4.3)   21  15:15    


 


Baso % (Auto)  0.5 % (0.0-1.8)   21  15:15    


 


Lymph # (Auto)  1.2 K/mm3 (1.2-5.4)   21  15:15    


 


Mono # (Auto)  0.6 K/mm3 (0.0-0.8)   21  15:15    


 


Eos # (Auto)  0.1 K/mm3 (0.0-0.4)   21  15:15    


 


Baso # (Auto)  0.0 K/mm3 (0.0-0.1)   21  15:15    


 


Seg Neutrophils %  64.1 % (40.0-70.0)   21  15:15    


 


Seg Neutrophils #  3.5 K/mm3 (1.8-7.7)   21  15:15    


 


Sodium  136 mmol/L (137-145)  L  21  18:07    


 


Potassium  4.3 mmol/L (3.6-5.0)   21  18:07    


 


Chloride  102.0 mmol/L ()   21  18:07    


 


Carbon Dioxide  25 mmol/L (22-30)   21  18:07    


 


Anion Gap  13 mmol/L  21  18:07    


 


BUN  16 mg/dL (9-20)   21  18:07    


 


Creatinine  0.5 mg/dL (0.8-1.3)  L  21  18:07    


 


Estimated GFR  > 60 ml/min  21  18:07    


 


BUN/Creatinine Ratio  32 %  21  18:07    


 


Glucose  205 mg/dL ()  H  21  18:07    


 


POC Glucose  203 mg/dL ()  H  21  09:01    


 


Hemoglobin A1c  6.9 % (4-6)  H  21  15:15    


 


Calcium  8.8 mg/dL (8.4-10.2)   21  18:07    


 


Urine Color  Yellow  (Yellow)   21  Unknown


 


Urine Turbidity  Slightly-cloudy  (Clear)   21  Unknown


 


Urine pH  6.0  (5.0-7.0)   21  Unknown


 


Ur Specific Gravity  1.024  (1.003-1.030)   21  Unknown


 


Urine Protein  30 mg/dl mg/dL (Negative)   21  Unknown


 


Urine Glucose (UA)  50 mg/dL (Negative)   21  Unknown


 


Urine Ketones  Neg mg/dL (Negative)   21  Unknown


 


Urine Blood  Neg  (Negative)   21  Unknown


 


Urine Nitrite  Neg  (Negative)   21  Unknown


 


Urine Bilirubin  Neg  (Negative)   21  Unknown


 


Urine Urobilinogen  4.0 mg/dL (<2.0)   21  Unknown


 


Ur Leukocyte Esterase  Neg  (Negative)   21  Unknown


 


Urine WBC (Auto)  3.0 /HPF (0.0-6.0)   21  Unknown


 


Urine RBC (Auto)  4.0 /HPF (0.0-6.0)   21  Unknown


 


Calcium Oxalate Crystal  Few   21  Unknown


 


Urine Mucus  1+ /HPF  21  Unknown


 


Urine Yeast (Budding)  1+ /HPF  21  Unknown


 


Vancomycin Trough  12.2 ug/mL (5.0-20.0)   21  21:10    


 


Urine Opiates Screen  Negative   21  Unknown


 


Urine Methadone Screen  Negative   21  Unknown


 


Ur Barbiturates Screen  Negative   21  Unknown


 


Ur Phencyclidine Scrn  Negative   21  Unknown


 


Ur Amphetamines Screen  Negative   21  Unknown


 


U Benzodiazepines Scrn  Negative   21  Unknown


 


Urine Cocaine Screen  Negative   21  Unknown


 


U Marijuana (THC) Screen  Negative   21  Unknown


 


Drugs of Abuse Note  Disclamer   21  Unknown











Microbiology: 


Microbiology





21 19:08   Peripheral/Venous   Blood Culture - Preliminary


                            NO GROWTH AFTER 4 DAYS


21 19:02   Peripheral/Venous   Blood Culture - Preliminary


                            NO GROWTH AFTER 4 DAYS


21 Unknown   Hip - Right   Wound Culture - Preliminary


                                Portia Albicans


                                Gram Negative Marcellus








Perdue/IV: 


                                        





Voiding Method                   Urinal











Active Medications





- Current Medications


Current Medications: 














Generic Name Dose Route Start Last Admin





  Trade Name Freq  PRN Reason Stop Dose Admin


 


Acetaminophen  650 mg  21 13:00  21 10:21





  Acetaminophen 325 Mg Tab  PO   650 mg





  Q4H PRN   Administration





  Pain MILD(1-3)/Fever >100.5/HA  


 


Albuterol  2.5 mg  21 09:00 





  Albuterol 2.5 Mg/3 Ml Nebu  IH  





  Q4HRT PRN  





  Shortness Of Breath  


 


Doxepin HCl  50 mg  21 22:00  21 21:51





  Doxepin 25 Mg Cap  PO   50 mg





  [facility]   Administration


 


Famotidine  20 mg  21 10:00  21 21:53





  Famotidine 20 Mg/2 Ml Inj  IV   20 mg





  BID [place]   Administration


 


Heparin Sodium (Porcine)  5,000 unit  21 10:00  21 21:53





  Heparin 5,000 Unit/1 Ml Vial  SUB-Q   5,000 unit





  Q12HR [facility]   Administration


 


Cefepime HCl  2 gm in 100 mls @ 200 mls/hr  21 14:00  21 21:53





  Cefepime/Ns 2 Gm/100 Ml  IV   200 mls/hr





  Q12HR [facility]   Administration





  Protocol  


 


Insulin Glargine  10 units  21 22:00  21 21:52





  Insulin Glargine 100 Units/Ml  SUB-Q   10 units





  [facility]   Administration


 


Insulin Human Lispro  0 unit  21 11:30  09/27/21 22:15





  Insulin Lispro 100 Unit/Ml  SUB-Q   3 unit





  [facility]   Administration





  Protocol  


 


Metoclopramide HCl  10 mg  21 00:20 





  Metoclopramide 10 Mg/2 Ml Inj  IV  





  Q6H PRN  





  Nausea And Vomiting  


 


Morphine Sulfate  2 mg  21 00:20  21 12:55





  Morphine 2 Mg/1 Ml Inj  IV   2 mg





  Q4H PRN   Administration





  Pain, Moderate (4-6)  


 


Ondansetron HCl  4 mg  21 13:00 





  Ondansetron 4 Mg/2 Ml Inj  IV  





  Q8H PRN  





  Nausea And Vomiting  


 


Oxycodone/Acetaminophen  2 tab  21 11:19  21 10:42





  Oxycodone /Acetaminophen 5-325mg Tab  PO   2 tab





  Q4H PRN   Administration





  Pain, Moderate (4-6)  


 


Sodium Chloride  10 ml  21 12:00  21 21:51





  Sodium Chloride 0.9% 10 Ml Flush Syringe  IV   10 ml





  BID [place]   Administration


 


Sodium Chloride  10 ml  21 13:00 





  Sodium Chloride 0.9% 10 Ml Flush Syringe  IV  





  PRN PRN  





  LINE FLUSH  














Nutrition/Malnutrition Assess





- Dietary Evaluation


Nutrition/Malnutrition Findings: 


                                        





Nutrition Notes                                            Start:  21 10

:39


Freq:                                                      Status: Active       




Protocol:                                                                       




 Document     21 16:48  GB  (Rec: 21 16:54  GB  [id number])


 Nutrition Notes


     Initial or Follow up                        Reassessment


     Current Diagnosis                           COPD,Hypertension


     Other Pertinent Diagnosis                   nonhealing surgical wound,


                                                 hepatitis, cirrhosis


     Current Diet                                Cardiac Diet


     Labs/Tests                                  : Na 136, Creatinine 0.5,


                                                 glucose 205


     Pertinent Medications                       Humalog


     Height                                      5 ft 4 in


     Weight                                      72.6 kg


     Ideal Body Weight (kg)                      59.09


     BMI                                         27.4


     Weight change and time frame                weight moderately stable x 5


                                                 months


     Weight Status                               Overweight


     Subjective/Other Information                Per MD notes : ortho


                                                 determined no surgery needed,


                                                 unhealing wound to right hip,


                                                 unable to heal with medication


                                                 alone.  PO intake is 100%.


                                                 All nutrient needs are met


                                                 with PO intake of meals.


     Percent of energy/protein needs met:        75% or greater


     Burn                                        Absent


     Trauma                                      Absent


     GI Symptoms                                 None


     Food Allergy                                No


     Current % PO                                Good (%)


     Minimum of two criteria                     No


     #1


      Nutrition Diagnosis                        Increased nutrient needs   (


                                                 specify in comment below)


      Comments:                                  protein


      Etiology                                   wound healing


      As Evidenced by Signs and Symptoms         surgical wound present


      Diagnosis Progress(for reassessment        Continues


       documentation)                            


     Is patient on ventilator?                   No


     Is Patient Ambulatory and/or Out of Bed     No


     REE-([facility]-confined to bed)      [phone]


     Kcal/Kg value to use for calculation        25


     Approximate Energy Requirements Using       1815


      kcal/Kg                                    


     Calculation Used for Recommendations        Kcal/kg


     Additional Notes                            Protein 1-1.2 g/kg @ 72k-


                                                 86g


                                                 Fluids: 1 ml/kcal or per MD


 Nutrition Intervention


     Change Diet Order:                          Continue current diet


     Nutrition Support:                          n/a


     Add Supplement/Snack (indicate name/kcal    Kulwinder BID


      /protein )                                 


     Provides kCal:                              95


     Provides Protein (gm)                       5


     Goal #1                                     Wound healing


     Goal #2                                     PO intake of meals to continue


                                                 at 75% or greater TID daily


                                                 for LOS


     Revisit per MD consult or patient           Sign Off


      request:

## 2021-09-29 NOTE — ANESTHESIA CONSULTATION
Anesthesia Consult and Med Hx


Date of service: 09/29/21





- Airway


Anesthetic Teeth Evaluation: Poor


ROM Head & Neck: Adequate


Mental/Hyoid Distance: Adequate


Mallampati Class: Class III


Intubation Access Assessment: Possibly Difficult





- Pre-Operative Health Status


ASA Pre-Surgery Classification: ASA3


Proposed Anesthetic Plan: General





- Pulmonary


Hx Smoking: Yes (former smoker quit 3 yrs)


Hx Respiratory Symptoms: No


COPD: Yes





- Cardiovascular System


Hx Hypertension: No





- Central Nervous System


CVA: No





- Endocrine


Hx Renal Disease: No


Hx Insulin Dependent Diabetes: No


Hx Non-Insulin Dependent Diabetes: No


Hx Thyroid Disease: No





- Hematic


Hx Anemia: No





- Other Systems


Hx Alcohol Use: Yes


Hx Obesity: No

## 2021-09-29 NOTE — PROGRESS NOTE
Assessment and Plan


Assessment and plan: 


61-year-old male with history of EtOH dependence, hepatitis/cirrhosis, diabetes 

and COPD had total right hip replacement on 2021 and [address] [place] [address].  Patient was doing well for couple of weeks.  Patient has been 

incarcerated for the last 13 months.  Over the last couple weeks patient has 





Previous admission in August 1 week


Patient is 59 yo  Male with COPD, Nicotine Dependence, ETOH Dependence 

complicated by Cirrhosis, EDWAR, Mild Intermittent Asthma presents to ED for 

evaluation following a fall.  The patient was seen and evaluated in the 

emergency department. Patient found to have right hip fracture, as well as 

metabolic acidosis.  Patient admitted to surgical floor.  Orthopedic surgery 

service consulted.


Right hip fracture with right total hip replacement on 2021





cultures growing GNR and Candida





- Imaging and Cardiology


Imaging and Cardiology: 





Lower extremity CAT scan


Periprosthetic fracture centered over the tip of the femoral stem component


Complex collection tracking along the lateral border of the proximal femur as 

outlined above with sinus tract extending to the subcutaneous tissues along the 

lateral proximal thigh


Findings are worrisome for abscess formation








(1) Postoperative abscess


Current Visit: Yes   Status: Acute   


Plan to address problem: 


Abscess in the right hip region at the postoperative site with possible recur

rent fracture


IV Unasyn and IV vancomycin started


Orthopedic consult


May need revision surgery and abscess drainage


ID also consulted





(2) COPD (chronic obstructive pulmonary disease)


Current Visit: No   Status: Chronic   


Qualifiers: 


   COPD type: chronic bronchitis 


Plan to address problem: 


Nebulizer treatments as needed








(3) Hypertension


Current Visit: Yes   Status: Chronic   


Qualifiers: 


   Hypertension type: primary hypertension   Qualified Code(s): I10 - Essential 

(primary) hypertension   


Plan to address problem: 


Continue antihypertensives and adjust medications








(4) T2DM (type 2 diabetes mellitus)


Current Visit: Yes   Status: Chronic   


Qualifiers: 


   Diabetes mellitus long term insulin use: unspecified long term insulin use 

status 


Plan to address problem: 


Continue coverage and check hemoglobin A1c








(5) DVT prophylaxis


Current Visit: Yes   Status: Acute   


Plan to address problem: 


On heparin and GI prophylaxis








-Patient is admitted for right hip fracture, abscess on the right hip area


-Orthopedics and ID consulted


-Patient is currently on Unasyn and vancomycin


-Blood pressure controlled, blood sugars in target


-Continue current management and follow with orthopedics and ID.








-I&D of the abscess was done by orthopedics.  Recommend conservative management


-ID saw the patient and recommend to continue empiric cefepime and vancomycin


-Wound cultures pending


-Patient has severe pain and on Percocet


-PT evaluation








-Continue with empiric cefepime and vancomycin, Follow with ID for discharge 

planning.  Blood cultures show no growth so far.





: Patient seen and examined resting comfortably.  Surgical site infection 

with complex collection tracking along the lateral aspect of proximal femur with

sinus tract extending to the subcutaneous tissue concerning for abscess 

formation which has been I indeed.  Cultures growing gram-negative paul we will 

continue Cefepime but infectious disease.  Also for recommendation that Ortho 

should reevaluate the patient as this cannot be cured by antibiotics alone.  

Patient is a ball of the state and remains in police custody at this time.  

Dressing is intact no overt drainage noted





: Continue supportive care.  Awaiting surgical reevaluation.  The patient 

tells me that the surgeon said they may need to do a hip replacement.  I will 

also reach out to the surgeon for now continue treatment with antibiotics.  

Discussed with the officer and bedside and also with the patient.  Continue with

management PT OT





: Patient is going to the OR today for revision of arthroplasty and possible

hip replacement.  Continue current antibiotic therapy and follow recommendation 

by ID and orthopedic surgeon.  Plan discussed with the patient in detail





History


Interval history: 


Patient seen and examined resting comfortable.  Plan for surgery today at noon





Hospitalist Physical





- Physical exam


Narrative exam: 


 Not in cardiopulmonary distress. 


 The patient appeared well nourished and normally developed.


 Vital signs as documented.


 Head exam is unremarkable.


 No scleral icterus .


 Neck is without jugular venous distension, thyromegaly, or carotid bruits. 


 Lungs are clear to auscultation.


Cardiac exam reveals regular rate and  Rhythm. 


Abdominal exam reveals normal bowel sounds, nontender, no organomegaly.


Extremities are nonedematous and both femoral and pedal pulses are normal.  

Dressing noted around the right hip joint with no overt drainage


CNS: Alert and oriented 3.  No focal weakness.








- Constitutional


Vitals: 


                                        











Temp Pulse Resp BP Pulse Ox


 


 97.7 F   82   18   116/74   95 


 


 21 08:50  21 08:50  21 08:50  21 08:50  21 08:50











General appearance: Present: no acute distress, well-nourished





Results





- Labs


CBC & Chem 7: 


                                 21 15:15





                                 21 18:07


Labs: 


                             Laboratory Last Values











WBC  5.5 K/mm3 (4.5-11.0)   21  15:15    


 


RBC  4.58 M/mm3 (3.65-5.03)   21  15:15    


 


Hgb  14.0 gm/dl (11.8-15.2)   21  15:15    


 


Hct  41.7 % (35.5-45.6)   21  15:15    


 


MCV  91 fl (84-94)   21  15:15    


 


MCH  31 pg (28-32)   21  15:15    


 


MCHC  34 % (32-34)   21  15:15    


 


RDW  15.8 % (13.2-15.2)  H  21  15:15    


 


Plt Count  126 K/mm3 (140-440)  L  21  15:15    


 


Lymph % (Auto)  22.4 % (13.4-35.0)   21  15:15    


 


Mono % (Auto)  10.9 % (0.0-7.3)  H  21  15:15    


 


Eos % (Auto)  2.1 % (0.0-4.3)   21  15:15    


 


Baso % (Auto)  0.5 % (0.0-1.8)   21  15:15    


 


Lymph # (Auto)  1.2 K/mm3 (1.2-5.4)   21  15:15    


 


Mono # (Auto)  0.6 K/mm3 (0.0-0.8)   21  15:15    


 


Eos # (Auto)  0.1 K/mm3 (0.0-0.4)   21  15:15    


 


Baso # (Auto)  0.0 K/mm3 (0.0-0.1)   21  15:15    


 


Seg Neutrophils %  64.1 % (40.0-70.0)   21  15:15    


 


Seg Neutrophils #  3.5 K/mm3 (1.8-7.7)   21  15:15    


 


Sodium  136 mmol/L (137-145)  L  21  18:07    


 


Potassium  4.3 mmol/L (3.6-5.0)   21  18:07    


 


Chloride  102.0 mmol/L ()   21  18:07    


 


Carbon Dioxide  25 mmol/L (22-30)   21  18:07    


 


Anion Gap  13 mmol/L  21  18:07    


 


BUN  16 mg/dL (9-20)   21  18:07    


 


Creatinine  0.5 mg/dL (0.8-1.3)  L  21  18:07    


 


Estimated GFR  > 60 ml/min  21  18:07    


 


BUN/Creatinine Ratio  32 %  21  18:07    


 


Glucose  205 mg/dL ()  H  21  18:07    


 


POC Glucose  170 mg/dL ()  H  21  09:23    


 


Hemoglobin A1c  6.9 % (4-6)  H  21  15:15    


 


Calcium  8.8 mg/dL (8.4-10.2)   21  18:07    


 


Urine Color  Yellow  (Yellow)   21  Unknown


 


Urine Turbidity  Slightly-cloudy  (Clear)   21  Unknown


 


Urine pH  6.0  (5.0-7.0)   21  Unknown


 


Ur Specific Gravity  1.024  (1.003-1.030)   21  Unknown


 


Urine Protein  30 mg/dl mg/dL (Negative)   21  Unknown


 


Urine Glucose (UA)  50 mg/dL (Negative)   21  Unknown


 


Urine Ketones  Neg mg/dL (Negative)   21  Unknown


 


Urine Blood  Neg  (Negative)   21  Unknown


 


Urine Nitrite  Neg  (Negative)   21  Unknown


 


Urine Bilirubin  Neg  (Negative)   21  Unknown


 


Urine Urobilinogen  4.0 mg/dL (<2.0)   21  Unknown


 


Ur Leukocyte Esterase  Neg  (Negative)   21  Unknown


 


Urine WBC (Auto)  3.0 /HPF (0.0-6.0)   21  Unknown


 


Urine RBC (Auto)  4.0 /HPF (0.0-6.0)   21  Unknown


 


Calcium Oxalate Crystal  Few   21  Unknown


 


Urine Mucus  1+ /HPF  21  Unknown


 


Urine Yeast (Budding)  1+ /HPF  21  Unknown


 


Vancomycin Trough  12.2 ug/mL (5.0-20.0)   21  21:10    


 


Urine Opiates Screen  Negative   21  Unknown


 


Urine Methadone Screen  Negative   21  Unknown


 


Ur Barbiturates Screen  Negative   21  Unknown


 


Ur Phencyclidine Scrn  Negative   21  Unknown


 


Ur Amphetamines Screen  Negative   21  Unknown


 


U Benzodiazepines Scrn  Negative   21  Unknown


 


Urine Cocaine Screen  Negative   21  Unknown


 


U Marijuana (THC) Screen  Negative   21  Unknown


 


Drugs of Abuse Note  Disclamer   21  Unknown











Microbiology: 


Microbiology





21 Unknown   Hip - Right   Wound Culture - Final


                                Portia Albicans


21 19:08   Peripheral/Venous   Blood Culture - Final


                            NO GROWTH AFTER 5 DAYS


21 19:02   Peripheral/Venous   Blood Culture - Final


                            NO GROWTH AFTER 5 DAYS








Perdue/IV: 


                                        





Voiding Method                   Urinal











Active Medications





- Current Medications


Current Medications: 














Generic Name Dose Route Start Last Admin





  Trade Name Freq  PRN Reason Stop Dose Admin


 


Acetaminophen  650 mg  21 13:00  21 10:21





  Acetaminophen 325 Mg Tab  PO   650 mg





  Q4H PRN   Administration





  Pain MILD(1-3)/Fever >100.5/HA  


 


Albuterol  2.5 mg  21 09:00 





  Albuterol 2.5 Mg/3 Ml Nebu  IH  





  Q4HRT PRN  





  Shortness Of Breath  


 


Doxepin HCl  50 mg  21 22:00  21 22:16





  Doxepin 25 Mg Cap  PO   50 mg





  [facility] [place]   Administration


 


Famotidine  20 mg  21 10:00  21 22:17





  Famotidine 20 Mg/2 Ml Inj  IV   20 mg





  BID [place]   Administration


 


Heparin Sodium (Porcine)  5,000 unit  21 10:00  21 09:21





  Heparin 5,000 Unit/1 Ml Vial  SUB-Q   Not Given





  Q12HR [place]  


 


Cefepime HCl  2 gm in 100 mls @ 200 mls/hr  21 14:00  21 23:00





  Cefepime/Ns 2 Gm/100 Ml  IV   200 mls/hr





  Q12HR [place]   Administration





  Protocol  


 


Insulin Glargine  20 units  21 22:00  21 22:17





  Insulin Glargine 100 Units/Ml  SUB-Q   20 units





  [facility] [place]   Administration


 


Insulin Human Lispro  0 unit  21 11:30  21 09:20





  Insulin Lispro 100 Unit/Ml  SUB-Q   Not Given





  [facility]  





  Protocol  


 


Metoclopramide HCl  10 mg  21 00:20 





  Metoclopramide 10 Mg/2 Ml Inj  IV  





  Q6H PRN  





  Nausea And Vomiting  


 


Morphine Sulfate  2 mg  21 00:20  21 12:55





  Morphine 2 Mg/1 Ml Inj  IV   2 mg





  Q4H PRN   Administration





  Pain, Moderate (4-6)  


 


Ondansetron HCl  4 mg  21 13:00 





  Ondansetron 4 Mg/2 Ml Inj  IV  





  Q8H PRN  





  Nausea And Vomiting  


 


Oxycodone/Acetaminophen  2 tab  21 11:19  21 23:14





  Oxycodone /Acetaminophen 5-325mg Tab  PO   2 tab





  Q4H PRN   Administration





  Pain, Moderate (4-6)  


 


Sodium Chloride  10 ml  21 12:00  21 22:17





  Sodium Chloride 0.9% 10 Ml Flush Syringe  IV   10 ml





  BID [place]   Administration


 


Sodium Chloride  10 ml  21 13:00 





  Sodium Chloride 0.9% 10 Ml Flush Syringe  IV  





  PRN PRN  





  LINE FLUSH  














Nutrition/Malnutrition Assess





- Dietary Evaluation


Nutrition/Malnutrition Findings: 


                                        





Nutrition Notes                                            Start:  21 

10:39


Freq:                                                      Status: Active       




Protocol:                                                                       




 Document     21 16:48  GB  (Rec: 21 16:54  GB  [id number])


 Nutrition Notes


     Initial or Follow up                        Reassessment


     Current Diagnosis                           COPD,Hypertension


     Other Pertinent Diagnosis                   nonhealing surgical wound,


                                                 hepatitis, cirrhosis


     Current Diet                                Cardiac Diet


     Labs/Tests                                  : Na 136, Creatinine 0.5,


                                                 glucose 205


     Pertinent Medications                       Humalog


     Height                                      5 ft 4 in


     Weight                                      72.6 kg


     Ideal Body Weight (kg)                      59.09


     BMI                                         27.4


     Weight change and time frame                weight moderately stable x 5


                                                 months


     Weight Status                               Overweight


     Subjective/Other Information                Per MD notes : ortho


                                                 determined no surgery needed,


                                                 unhealing wound to right hip,


                                                 unable to heal with medication


                                                 alone.  PO intake is 100%.


                                                 All nutrient needs are met


                                                 with PO intake of meals.


     Percent of energy/protein needs met:        75% or greater


     Burn                                        Absent


     Trauma                                      Absent


     GI Symptoms                                 None


     Food Allergy                                No


     Current % PO                                Good (%)


     Minimum of two criteria                     No


     #1


      Nutrition Diagnosis                        Increased nutrient needs   (


                                                 specify in comment below)


      Comments:                                  protein


      Etiology                                   wound healing


      As Evidenced by Signs and Symptoms         surgical wound present


      Diagnosis Progress(for reassessment        Continues


       documentation)                            


     Is patient on ventilator?                   No


     Is Patient Ambulatory and/or Out of Bed     No


     REE-([facility]-confined to bed)      [phone]


     Kcal/Kg value to use for calculation        25


     Approximate Energy Requirements Using       1815


      kcal/Kg                                    


     Calculation Used for Recommendations        Kcal/kg


     Additional Notes                            Protein 1-1.2 g/kg @ 72k-


                                                 86g


                                                 Fluids: 1 ml/kcal or per MD


 Nutrition Intervention


     Change Diet Order:                          Continue current diet


     Nutrition Support:                          n/a


     Add Supplement/Snack (indicate name/kcal    Kulwinder BID


      /protein )                                 


     Provides kCal:                              95


     Provides Protein (gm)                       5


     Goal #1                                     Wound healing


     Goal #2                                     PO intake of meals to continue


                                                 at 75% or greater TID daily


                                                 for LOS


     Revisit per MD consult or patient           Sign Off


      request:

## 2021-09-30 NOTE — PROCEDURE NOTE
Date of procedure: 09/29/21


Pre-op diagnosis: Infected right hip


Post-op diagnosis: same


Procedure: 





Incision and drainage right hip





Procedure


The patient was brought to the OR and placed on the OR table in the supine 

position following induction and intubation by anesthesia the patient was placed

on the OR table in a semilateral position, the right hip was then prepped and 

draped in the usual sterile manner.  A timeout procedure was done to identify 

the patient and the correct operative site.  Using a #10 blade the inferior 

portion of the incision was incised this was followed by probing with a return 

of old clotted hematoma no actual purulent material was seen next pulse lavage 

was used to further debride the soft tissue structures following thorough 

irrigation and debridement the wound was then packed open with iodoform gauze 

routine postop dressings were applied patient tolerated procedure there were no 

complications.


Anesthesia: RENAN


Surgeon: GRAEME JOYCE


Assistant: KRISTEN HENDRICKSON


Estimated blood loss: 50-100ml


Pathology: list (Cultures were sent for routine sensitivity studies)


Specimen disposition: to lab


Condition: stable


Disposition: PACU

## 2021-09-30 NOTE — PROGRESS NOTE
Assessment and Plan


Cultures:


9/23/2021 blood culture: no growth


9/24/2021 wound culture: Candida albicans, previously also reported GNR on the 

report initially but later addended to just Candida


9/29/2021 right hip surgical culture: Gram stain negative, culture in process.





A/P:


61-year-old male with COPD, nicotine dependence, alcohol abuse, cirrhosis, 

currently incarcerated, recently hospitalized with right hip fracture and 

underwent right DIMPLE on 8/12/2021:





#Surgical site infection, right hip prosthetic joint infection, periprosthetic 

fracture and concern for abscess: Hip x-ray revealed a periprosthetic fracture, 

CT revealed a periprosthetic fracture and a complex collection tracking along 

the lateral aspect of the proximal femur with a sinus tract extending to the 

subcutaneous tissues concerning for abscess formation. This meets major MSIS 

criteria for prosthetic joint infection. 





Underwent I&D of the right hip, old clotted hematoma was noted, no actual 

purulent material was seen as per the operative note.  Cultures were sent and 

are in progress.





Recs:


-Cefepime switched to Ceftriaxone


-CRP ordered for AM


-follow up deep OR cultures to help decide antibiotic plan and duration








Kacy Hawkins MD, FACP


[facility] ([facility])


O: [phone]


F: [phone]








Subjective


Date of service: 09/30/21


Interval history: 





Afebrile. Patient unsure when he underwent surgery, per chart review, underwent 

I&D of the right hip last night, old clotted hematoma was noted, no actual 

purulent material was seen as per the operative note.  Cultures were sent and 

are in progress.





Objective





- Exam


Narrative Exam: 





Physical Exam: 


Constitutional: Alert, cooperative. No acute distress


Head, Ears, Nose: Normocephalic, atraumatic. External ears, nose normal


Eyes: Conjunctivae/corneas clear. No icterus. No ptosis.


Neck: Supple, no meningeal signs


Oral: Poor dentition


Cardiovascular: S1, S2 +


Respiratory: Good air entry, clear to auscultation bilaterally


GI: Soft, non-tender; bowel sounds normal. No peritoneal signs


Musculoskeletal: Right hip incision noted, dressing present


Skin: No rash or abscess


Hem/Lymphatic: No palpable cervical or supraclavicular nodes. No lymphangitis


Psych: Mood ok. Affect normal


Neurological: Awake, alert, oriented. No gross abnormality     





- Constitutional


Vitals: 


                                   Vital Signs











Temp Pulse Resp BP Pulse Ox


 


 98.6 F   95 H  18   128/67   96 


 


 09/30/21 11:08  09/30/21 11:08  09/30/21 11:08  09/30/21 11:08  09/30/21 11:08








                           Temperature -Last 24 Hours











Temperature                    98.6 F


 


Temperature                    98.6 F


 


Temperature                    98.2 F


 


Temperature                    98.7 F


 


Temperature                    97.6 F


 


Temperature                    97.0 F

















- Labs


CBC & Chem 7: 


                                 09/23/21 15:15





                                 09/26/21 18:07


Labs: 


                              Abnormal lab results











  09/29/21 09/29/21 09/29/21 Range/Units





  13:27 16:31 20:33 


 


POC Glucose  106 H  229 H  220 H  ()  mg/dL














  09/30/21 09/30/21 Range/Units





  07:56 11:06 


 


POC Glucose  175 H  168 H  ()  mg/dL

## 2021-09-30 NOTE — PROGRESS NOTE
Assessment and Plan


Assessment and plan: 


61-year-old male with history of EtOH dependence, hepatitis/cirrhosis, diabetes 

and COPD had total right hip replacement on 2021 and [address] [place] [address].  Patient was doing well for couple of weeks.  Patient has been 

incarcerated for the last 13 months.  Over the last couple weeks patient has 





Previous admission in August 1 week


Patient is 61 yo  Male with COPD, Nicotine Dependence, ETOH Dependence 

complicated by Cirrhosis, EDWAR, Mild Intermittent Asthma presents to ED for 

evaluation following a fall.  The patient was seen and evaluated in the 

emergency department. Patient found to have right hip fracture, as well as 

metabolic acidosis.  Patient admitted to surgical floor.  Orthopedic surgery 

service consulted.


Right hip fracture with right total hip replacement on 2021





cultures growing GNR and Candida





- Imaging and Cardiology


Imaging and Cardiology: 





Lower extremity CAT scan


Periprosthetic fracture centered over the tip of the femoral stem component


Complex collection tracking along the lateral border of the proximal femur as 

outlined above with sinus tract extending to the subcutaneous tissues along the 

lateral proximal thigh


Findings are worrisome for abscess formation








(1) Postoperative abscess


Current Visit: Yes   Status: Acute   


Plan to address problem: 


Abscess in the right hip region at the postoperative site with possible recur

rent fracture


IV Unasyn and IV vancomycin started


Orthopedic consult


May need revision surgery and abscess drainage


ID also consulted





(2) COPD (chronic obstructive pulmonary disease)


Current Visit: No   Status: Chronic   


Qualifiers: 


   COPD type: chronic bronchitis 


Plan to address problem: 


Nebulizer treatments as needed








(3) Hypertension


Current Visit: Yes   Status: Chronic   


Qualifiers: 


   Hypertension type: primary hypertension   Qualified Code(s): I10 - Essential 

(primary) hypertension   


Plan to address problem: 


Continue antihypertensives and adjust medications








(4) T2DM (type 2 diabetes mellitus)


Current Visit: Yes   Status: Chronic   


Qualifiers: 


   Diabetes mellitus long term insulin use: unspecified long term insulin use 

status 


Plan to address problem: 


Continue coverage and check hemoglobin A1c








(5) DVT prophylaxis


Current Visit: Yes   Status: Acute   


Plan to address problem: 


On heparin and GI prophylaxis








-Patient is admitted for right hip fracture, abscess on the right hip area


-Orthopedics and ID consulted


-Patient is currently on Unasyn and vancomycin


-Blood pressure controlled, blood sugars in target


-Continue current management and follow with orthopedics and ID.








-I&D of the abscess was done by orthopedics.  Recommend conservative management


-ID saw the patient and recommend to continue empiric cefepime and vancomycin


-Wound cultures pending


-Patient has severe pain and on Percocet


-PT evaluation








-Continue with empiric cefepime and vancomycin, Follow with ID for discharge 

planning.  Blood cultures show no growth so far.





: Patient seen and examined resting comfortably.  Surgical site infection 

with complex collection tracking along the lateral aspect of proximal femur with

sinus tract extending to the subcutaneous tissue concerning for abscess 

formation which has been I indeed.  Cultures growing gram-negative paul we will 

continue Cefepime but infectious disease.  Also for recommendation that Ortho 

should reevaluate the patient as this cannot be cured by antibiotics alone.  

Patient is a ball of the state and remains in police custody at this time.  

Dressing is intact no overt drainage noted





: Continue supportive care.  Awaiting surgical reevaluation.  The patient 

tells me that the surgeon said they may need to do a hip replacement.  I will 

also reach out to the surgeon for now continue treatment with antibiotics.  

Discussed with the officer and bedside and also with the patient.  Continue with

management PT OT





: Patient is going to the OR today for revision of arthroplasty and possible

hip replacement.  Continue current antibiotic therapy and follow recommendation 

by ID and orthopedic surgeon.  Plan discussed with the patient in detail





: Patient seen and examined this morning tolerating diet.  His tells me he 

did not have surgery yesterday but the nurse states that he did indeed go for a 

washout and debridement I have not seen the full surgical note order anesthesia 

note is also noted.  We will continue antibiotics await cultures from the study.

 Further recommendation per ID and surgery.





History


Interval history: 


Patient seen and examined resting comfortable.  Patient underwent revision 

surgery yesterday with washout debridement.  Full surgical notes still pending





Hospitalist Physical





- Physical exam


Narrative exam: 


 Not in cardiopulmonary distress. 


 The patient appeared well nourished and normally developed.


 Vital signs as documented.


 Head exam is unremarkable.


 No scleral icterus .


 Neck is without jugular venous distension, thyromegaly, or carotid bruits. 


 Lungs are clear to auscultation.


Cardiac exam reveals regular rate and  Rhythm. 


Abdominal exam reveals normal bowel sounds, nontender, no organomegaly.


Extremities are nonedematous and both femoral and pedal pulses are normal.  

Dressing noted around the right hip joint with no overt drainage


CNS: Alert and oriented 3.  No focal weakness.








- Constitutional


Vitals: 


                                        











Temp Pulse Resp BP Pulse Ox


 


 98.2 F   82   20   124/74   96 


 


 21 04:46  21 04:46  21 04:46  21 04:46  21 04:46











General appearance: Present: no acute distress, well-nourished





Results





- Labs


CBC & Chem 7: 


                                 21 15:15





                                 21 18:07


Labs: 


                             Laboratory Last Values











WBC  5.5 K/mm3 (4.5-11.0)   21  15:15    


 


RBC  4.58 M/mm3 (3.65-5.03)   21  15:15    


 


Hgb  14.0 gm/dl (11.8-15.2)   21  15:15    


 


Hct  41.7 % (35.5-45.6)   21  15:15    


 


MCV  91 fl (84-94)   21  15:15    


 


MCH  31 pg (28-32)   21  15:15    


 


MCHC  34 % (32-34)   21  15:15    


 


RDW  15.8 % (13.2-15.2)  H  21  15:15    


 


Plt Count  126 K/mm3 (140-440)  L  21  15:15    


 


Lymph % (Auto)  22.4 % (13.4-35.0)   21  15:15    


 


Mono % (Auto)  10.9 % (0.0-7.3)  H  21  15:15    


 


Eos % (Auto)  2.1 % (0.0-4.3)   21  15:15    


 


Baso % (Auto)  0.5 % (0.0-1.8)   21  15:15    


 


Lymph # (Auto)  1.2 K/mm3 (1.2-5.4)   21  15:15    


 


Mono # (Auto)  0.6 K/mm3 (0.0-0.8)   21  15:15    


 


Eos # (Auto)  0.1 K/mm3 (0.0-0.4)   21  15:15    


 


Baso # (Auto)  0.0 K/mm3 (0.0-0.1)   21  15:15    


 


Seg Neutrophils %  64.1 % (40.0-70.0)   21  15:15    


 


Seg Neutrophils #  3.5 K/mm3 (1.8-7.7)   21  15:15    


 


Sodium  136 mmol/L (137-145)  L  21  18:07    


 


Potassium  4.3 mmol/L (3.6-5.0)   21  18:07    


 


Chloride  102.0 mmol/L ()   21  18:07    


 


Carbon Dioxide  25 mmol/L (22-30)   21  18:07    


 


Anion Gap  13 mmol/L  21  18:07    


 


BUN  16 mg/dL (9-20)   21  18:07    


 


Creatinine  0.5 mg/dL (0.8-1.3)  L  21  18:07    


 


Estimated GFR  > 60 ml/min  21  18:07    


 


BUN/Creatinine Ratio  32 %  21  18:07    


 


Glucose  205 mg/dL ()  H  21  18:07    


 


POC Glucose  175 mg/dL ()  H  21  07:56    


 


Hemoglobin A1c  6.9 % (4-6)  H  21  15:15    


 


Calcium  8.8 mg/dL (8.4-10.2)   21  18:07    


 


Urine Color  Yellow  (Yellow)   21  Unknown


 


Urine Turbidity  Slightly-cloudy  (Clear)   21  Unknown


 


Urine pH  6.0  (5.0-7.0)   21  Unknown


 


Ur Specific Gravity  1.024  (1.003-1.030)   21  Unknown


 


Urine Protein  30 mg/dl mg/dL (Negative)   21  Unknown


 


Urine Glucose (UA)  50 mg/dL (Negative)   21  Unknown


 


Urine Ketones  Neg mg/dL (Negative)   21  Unknown


 


Urine Blood  Neg  (Negative)   21  Unknown


 


Urine Nitrite  Neg  (Negative)   21  Unknown


 


Urine Bilirubin  Neg  (Negative)   21  Unknown


 


Urine Urobilinogen  4.0 mg/dL (<2.0)   21  Unknown


 


Ur Leukocyte Esterase  Neg  (Negative)   21  Unknown


 


Urine WBC (Auto)  3.0 /HPF (0.0-6.0)   21  Unknown


 


Urine RBC (Auto)  4.0 /HPF (0.0-6.0)   21  Unknown


 


Calcium Oxalate Crystal  Few   21  Unknown


 


Urine Mucus  1+ /HPF  21  Unknown


 


Urine Yeast (Budding)  1+ /HPF  21  Unknown


 


Vancomycin Trough  12.2 ug/mL (5.0-20.0)   21  21:10    


 


Urine Opiates Screen  Negative   21  Unknown


 


Urine Methadone Screen  Negative   21  Unknown


 


Ur Barbiturates Screen  Negative   21  Unknown


 


Ur Phencyclidine Scrn  Negative   21  Unknown


 


Ur Amphetamines Screen  Negative   21  Unknown


 


U Benzodiazepines Scrn  Negative   21  Unknown


 


Urine Cocaine Screen  Negative   21  Unknown


 


U Marijuana (THC) Screen  Negative   21  Unknown


 


Drugs of Abuse Note  Disclamer   21  Unknown











Microbiology: 


Microbiology





21 Unknown   Hip - Right   Surgical Culture - Preliminary


21 Unknown   Hip - Right   Wound Culture - Final


                                Portia Albicans








Perdue/IV: 


                                        





Voiding Method                   Urinal











Active Medications





- Current Medications


Current Medications: 














Generic Name Dose Route Start Last Admin





  Trade Name Freq  PRN Reason Stop Dose Admin


 


Acetaminophen  650 mg  21 13:00  21 10:21





  Acetaminophen 325 Mg Tab  PO   650 mg





  Q4H PRN   Administration





  Pain MILD(1-3)/Fever >100.5/HA  


 


Albuterol  2.5 mg  21 09:00 





  Albuterol 2.5 Mg/3 Ml Nebu  IH  





  Q4HRT PRN  





  Shortness Of Breath  


 


Doxepin HCl  50 mg  21 22:00  21 21:58





  Doxepin 25 Mg Cap  PO   50 mg





  [facility]   Administration


 


Famotidine  20 mg  21 10:00  21 21:57





  Famotidine 20 Mg/2 Ml Inj  IV   20 mg





  BID [place]   Administration


 


Heparin Sodium (Porcine)  5,000 unit  21 10:00  21 21:57





  Heparin 5,000 Unit/1 Ml Vial  SUB-Q   5,000 unit





  Q12HR [place]   Administration


 


Cefepime HCl  2 gm in 100 mls @ 200 mls/hr  21 14:00  21 21:56





  Cefepime/Ns 2 Gm/100 Ml  IV   200 mls/hr





  Q12HR [facility]   Administration





  Protocol  


 


Insulin Glargine  20 units  21 22:00  21 22:10





  Insulin Glargine 100 Units/Ml  SUB-Q   20 units





  [facility]   Administration


 


Insulin Human Lispro  0 unit  21 11:30  21 22:09





  Insulin Lispro 100 Unit/Ml  SUB-Q   4 unit





  [facility]   Administration





  Protocol  


 


Metoclopramide HCl  10 mg  21 00:20 





  Metoclopramide 10 Mg/2 Ml Inj  IV  





  Q6H PRN  





  Nausea And Vomiting  


 


Morphine Sulfate  2 mg  21 00:20  21 17:47





  Morphine 2 Mg/1 Ml Inj  IV   2 mg





  Q4H PRN   Administration





  Pain, Moderate (4-6)  


 


Ondansetron HCl  4 mg  21 13:00 





  Ondansetron 4 Mg/2 Ml Inj  IV  





  Q8H PRN  





  Nausea And Vomiting  


 


Oxycodone/Acetaminophen  2 tab  21 11:19  21 22:03





  Oxycodone /Acetaminophen 5-325mg Tab  PO   2 tab





  Q4H PRN   Administration





  Pain, Moderate (4-6)  


 


Sodium Chloride  10 ml  21 12:00  21 21:58





  Sodium Chloride 0.9% 10 Ml Flush Syringe  IV   10 ml





  BID [place]   Administration


 


Sodium Chloride  10 ml  21 13:00  21 17:47





  Sodium Chloride 0.9% 10 Ml Flush Syringe  IV   10 ml





  PRN PRN   Administration





  LINE FLUSH  














Nutrition/Malnutrition Assess





- Dietary Evaluation


Nutrition/Malnutrition Findings: 


                                        





Nutrition Notes                                            Start:  21 

10:39


Freq:                                                      Status: Active       




Protocol:                                                                       




 Document     21 16:48  GB  (Rec: 21 16:54  GB  [id number])


 Nutrition Notes


     Initial or Follow up                        Reassessment


     Current Diagnosis                           COPD,Hypertension


     Other Pertinent Diagnosis                   nonhealing surgical wound,


                                                 hepatitis, cirrhosis


     Current Diet                                Cardiac Diet


     Labs/Tests                                  : Na 136, Creatinine 0.5,


                                                 glucose 205


     Pertinent Medications                       Humalog


     Height                                      5 ft 4 in


     Weight                                      72.6 kg


     Ideal Body Weight (kg)                      59.09


     BMI                                         27.4


     Weight change and time frame                weight moderately stable x 5


                                                 months


     Weight Status                               Overweight


     Subjective/Other Information                Per MD notes : ortho


                                                 determined no surgery needed,


                                                 unhealing wound to right hip,


                                                 unable to heal with medication


                                                 alone.  PO intake is 100%.


                                                 All nutrient needs are met


                                                 with PO intake of meals.


     Percent of energy/protein needs met:        75% or greater


     Burn                                        Absent


     Trauma                                      Absent


     GI Symptoms                                 None


     Food Allergy                                No


     Current % PO                                Good (%)


     Minimum of two criteria                     No


     #1


      Nutrition Diagnosis                        Increased nutrient needs   (


                                                 specify in comment below)


      Comments:                                  protein


      Etiology                                   wound healing


      As Evidenced by Signs and Symptoms         surgical wound present


      Diagnosis Progress(for reassessment        Continues


       documentation)                            


     Is patient on ventilator?                   No


     Is Patient Ambulatory and/or Out of Bed     No


     REE-([facility]-confined to bed)      1735.380


     Kcal/Kg value to use for calculation        25


     Approximate Energy Requirements Using       1815


      kcal/Kg                                    


     Calculation Used for Recommendations        Kcal/kg


     Additional Notes                            Protein 1-1.2 g/kg @ 72k-


                                                 86g


                                                 Fluids: 1 ml/kcal or per MD


 Nutrition Intervention


     Change Diet Order:                          Continue current diet


     Nutrition Support:                          n/a


     Add Supplement/Snack (indicate name/kcal    Kulwinder BID


      /protein )                                 


     Provides kCal:                              95


     Provides Protein (gm)                       5


     Goal #1                                     Wound healing


     Goal #2                                     PO intake of meals to continue


                                                 at 75% or greater TID daily


                                                 for LOS


     Revisit per MD consult or patient           Sign Off


      request:

## 2021-10-01 NOTE — PROGRESS NOTE
Assessment and Plan


Assessment and plan: 


61-year-old male with history of EtOH dependence, hepatitis/cirrhosis, diabetes 

and COPD had total right hip replacement on 2021 and [address] [place] [address].  Patient was doing well for couple of weeks.  Patient has been 

incarcerated for the last 13 months.  Over the last couple weeks patient has 





Previous admission in August 1 week


Patient is 61 yo  Male with COPD, Nicotine Dependence, ETOH Dependence 

complicated by Cirrhosis, EDWAR, Mild Intermittent Asthma presents to ED for 

evaluation following a fall.  The patient was seen and evaluated in the 

emergency department. Patient found to have right hip fracture, as well as 

metabolic acidosis.  Patient admitted to surgical floor.  Orthopedic surgery 

service consulted.


Right hip fracture with right total hip replacement on 2021





cultures growing GNR and Candida





- Imaging and Cardiology


Imaging and Cardiology: 





Lower extremity CAT scan


Periprosthetic fracture centered over the tip of the femoral stem component


Complex collection tracking along the lateral border of the proximal femur as 

outlined above with sinus tract extending to the subcutaneous tissues along the 

lateral proximal thigh


Findings are worrisome for abscess formation








(1) Postoperative abscess


Current Visit: Yes   Status: Acute   


Plan to address problem: 


Abscess in the right hip region at the postoperative site with possible recur

rent fracture


IV Unasyn and IV vancomycin started


Orthopedic consult


May need revision surgery and abscess drainage


ID also consulted





(2) COPD (chronic obstructive pulmonary disease)


Current Visit: No   Status: Chronic   


Qualifiers: 


   COPD type: chronic bronchitis 


Plan to address problem: 


Nebulizer treatments as needed








(3) Hypertension


Current Visit: Yes   Status: Chronic   


Qualifiers: 


   Hypertension type: primary hypertension   Qualified Code(s): I10 - Essential 

(primary) hypertension   


Plan to address problem: 


Continue antihypertensives and adjust medications








(4) T2DM (type 2 diabetes mellitus)


Current Visit: Yes   Status: Chronic   


Qualifiers: 


   Diabetes mellitus long term insulin use: unspecified long term insulin use 

status 


Plan to address problem: 


Continue coverage and check hemoglobin A1c








(5) DVT prophylaxis


Current Visit: Yes   Status: Acute   


Plan to address problem: 


On heparin and GI prophylaxis








-Patient is admitted for right hip fracture, abscess on the right hip area


-Orthopedics and ID consulted


-Patient is currently on Unasyn and vancomycin


-Blood pressure controlled, blood sugars in target


-Continue current management and follow with orthopedics and ID.








-I&D of the abscess was done by orthopedics.  Recommend conservative management


-ID saw the patient and recommend to continue empiric cefepime and vancomycin


-Wound cultures pending


-Patient has severe pain and on Percocet


-PT evaluation








-Continue with empiric cefepime and vancomycin, Follow with ID for discharge 

planning.  Blood cultures show no growth so far.





: Patient seen and examined resting comfortably.  Surgical site infection 

with complex collection tracking along the lateral aspect of proximal femur with

sinus tract extending to the subcutaneous tissue concerning for abscess 

formation which has been I indeed.  Cultures growing gram-negative paul we will 

continue Cefepime but infectious disease.  Also for recommendation that Ortho 

should reevaluate the patient as this cannot be cured by antibiotics alone.  

Patient is a ball of the state and remains in police custody at this time.  

Dressing is intact no overt drainage noted





: Continue supportive care.  Awaiting surgical reevaluation.  The patient 

tells me that the surgeon said they may need to do a hip replacement.  I will 

also reach out to the surgeon for now continue treatment with antibiotics.  

Discussed with the officer and bedside and also with the patient.  Continue with

management PT OT





: Patient is going to the OR today for revision of arthroplasty and possible

hip replacement.  Continue current antibiotic therapy and follow recommendation 

by ID and orthopedic surgeon.  Plan discussed with the patient in detail





: Patient seen and examined this morning tolerating diet.  His tells me he 

did not have surgery yesterday but the nurse states that he did indeed go for a 

washout and debridement I have not seen the full surgical note order anesthesia 

note is also noted.  We will continue antibiotics await cultures from the study.

 Further recommendation per ID and surgery.





10/1: Continue supportive care continue current management.  Antibiotic therapy 

wound management.  Encourage the patient to be compliant with therapy.  Awaiting

cultures at 72 hours per ID to further guide therapy.  We will check labs in 

a.m.





History


Interval history: 


Patient seen and examined resting comfortable.  Patient underwent revision 

surgery yesterday with washout debridement.  Sometimes refusing therapy





Hospitalist Physical





- Physical exam


Narrative exam: 


 Not in cardiopulmonary distress. 


 The patient appeared well nourished and normally developed.


 Vital signs as documented.


 Head exam is unremarkable.


 No scleral icterus .


 Neck is without jugular venous distension, thyromegaly, or carotid bruits. 


 Lungs are clear to auscultation.


Cardiac exam reveals regular rate and  Rhythm. 


Abdominal exam reveals normal bowel sounds, nontender, no organomegaly.


Extremities are nonedematous and both femoral and pedal pulses are normal.  

Dressing noted around the right hip joint with no overt drainage


CNS: Alert and oriented 3.  No focal weakness.








- Constitutional


Vitals: 


                                        











Temp Pulse Resp BP Pulse Ox


 


 98.3 F   90   20   142/72   98 


 


 10/01/21 11:47  10/01/21 11:47  10/01/21 13:34  10/01/21 11:47  10/01/21 11:47











General appearance: Present: no acute distress, well-nourished





Results





- Labs


CBC & Chem 7: 


                                 21 15:15





                                 21 18:07


Labs: 


                             Laboratory Last Values











WBC  5.5 K/mm3 (4.5-11.0)   21  15:15    


 


RBC  4.58 M/mm3 (3.65-5.03)   21  15:15    


 


Hgb  14.0 gm/dl (11.8-15.2)   21  15:15    


 


Hct  41.7 % (35.5-45.6)   21  15:15    


 


MCV  91 fl (84-94)   21  15:15    


 


MCH  31 pg (28-32)   21  15:15    


 


MCHC  34 % (32-34)   21  15:15    


 


RDW  15.8 % (13.2-15.2)  H  21  15:15    


 


Plt Count  126 K/mm3 (140-440)  L  21  15:15    


 


Lymph % (Auto)  22.4 % (13.4-35.0)   21  15:15    


 


Mono % (Auto)  10.9 % (0.0-7.3)  H  21  15:15    


 


Eos % (Auto)  2.1 % (0.0-4.3)   21  15:15    


 


Baso % (Auto)  0.5 % (0.0-1.8)   21  15:15    


 


Lymph # (Auto)  1.2 K/mm3 (1.2-5.4)   21  15:15    


 


Mono # (Auto)  0.6 K/mm3 (0.0-0.8)   21  15:15    


 


Eos # (Auto)  0.1 K/mm3 (0.0-0.4)   21  15:15    


 


Baso # (Auto)  0.0 K/mm3 (0.0-0.1)   21  15:15    


 


Seg Neutrophils %  64.1 % (40.0-70.0)   21  15:15    


 


Seg Neutrophils #  3.5 K/mm3 (1.8-7.7)   21  15:15    


 


Sodium  136 mmol/L (137-145)  L  21  18:07    


 


Potassium  4.3 mmol/L (3.6-5.0)   21  18:07    


 


Chloride  102.0 mmol/L ()   21  18:07    


 


Carbon Dioxide  25 mmol/L (22-30)   21  18:07    


 


Anion Gap  13 mmol/L  21  18:07    


 


BUN  16 mg/dL (9-20)   21  18:07    


 


Creatinine  0.5 mg/dL (0.8-1.3)  L  21  18:07    


 


Estimated GFR  > 60 ml/min  21  18:07    


 


BUN/Creatinine Ratio  32 %  21  18:07    


 


Glucose  205 mg/dL ()  H  21  18:07    


 


POC Glucose  182 mg/dL ()  H  10/01/21  11:47    


 


Hemoglobin A1c  6.9 % (4-6)  H  21  15:15    


 


Calcium  8.8 mg/dL (8.4-10.2)   21  18:07    


 


Urine Color  Yellow  (Yellow)   21  Unknown


 


Urine Turbidity  Slightly-cloudy  (Clear)   21  Unknown


 


Urine pH  6.0  (5.0-7.0)   21  Unknown


 


Ur Specific Gravity  1.024  (1.003-1.030)   21  Unknown


 


Urine Protein  30 mg/dl mg/dL (Negative)   21  Unknown


 


Urine Glucose (UA)  50 mg/dL (Negative)   21  Unknown


 


Urine Ketones  Neg mg/dL (Negative)   21  Unknown


 


Urine Blood  Neg  (Negative)   21  Unknown


 


Urine Nitrite  Neg  (Negative)   21  Unknown


 


Urine Bilirubin  Neg  (Negative)   21  Unknown


 


Urine Urobilinogen  4.0 mg/dL (<2.0)   21  Unknown


 


Ur Leukocyte Esterase  Neg  (Negative)   21  Unknown


 


Urine WBC (Auto)  3.0 /HPF (0.0-6.0)   21  Unknown


 


Urine RBC (Auto)  4.0 /HPF (0.0-6.0)   21  Unknown


 


Calcium Oxalate Crystal  Few   21  Unknown


 


Urine Mucus  1+ /HPF  21  Unknown


 


Urine Yeast (Budding)  1+ /HPF  21  Unknown


 


Vancomycin Trough  12.2 ug/mL (5.0-20.0)   21  21:10    


 


Urine Opiates Screen  Negative   21  Unknown


 


Urine Methadone Screen  Negative   21  Unknown


 


Ur Barbiturates Screen  Negative   21  Unknown


 


Ur Phencyclidine Scrn  Negative   21  Unknown


 


Ur Amphetamines Screen  Negative   21  Unknown


 


U Benzodiazepines Scrn  Negative   21  Unknown


 


Urine Cocaine Screen  Negative   21  Unknown


 


U Marijuana (THC) Screen  Negative   21  Unknown


 


Drugs of Abuse Note  Disclamer   21  Unknown











Perdue/IV: 


                                        





Voiding Method                   Toilet











Active Medications





- Current Medications


Current Medications: 














Generic Name Dose Route Start Last Admin





  Trade Name Freq  PRN Reason Stop Dose Admin


 


Acetaminophen  650 mg  21 13:00  21 10:21





  Acetaminophen 325 Mg Tab  PO   650 mg





  Q4H PRN   Administration





  Pain MILD(1-3)/Fever >100.5/HA  


 


Albuterol  2.5 mg  21 09:00 





  Albuterol 2.5 Mg/3 Ml Nebu  IH  





  Q4HRT PRN  





  Shortness Of Breath  


 


Doxepin HCl  50 mg  21 22:00  21 21:24





  Doxepin 25 Mg Cap  PO   50 mg





  [facility]   Administration


 


Famotidine  20 mg  21 10:00  10/01/21 09:43





  Famotidine 20 Mg/2 Ml Inj  IV   20 mg





  BID [place]   Administration


 


Heparin Sodium (Porcine)  5,000 unit  21 10:00  10/01/21 09:43





  Heparin 5,000 Unit/1 Ml Vial  SUB-Q   5,000 unit





  Q12HR [place]   Administration


 


Ceftriaxone Sodium  2 gm in 100 mls @ 200 mls/hr  21 14:00  10/01/21 09:44





  Rocephin/Ns 2 Gm/100 Ml  IV   200 mls/hr





  Q24HR [place]   Administration





  Protocol  


 


Insulin Glargine  20 units  21 22:00  21 21:23





  Insulin Glargine 100 Units/Ml  SUB-Q   20 units





  [facility] [place]   Administration


 


Insulin Human Lispro  0 unit  21 11:30  10/01/21 13:40





  Insulin Lispro 100 Unit/Ml  SUB-Q   3 unit





  [facility] [place]   Administration





  Protocol  


 


Metoclopramide HCl  10 mg  21 00:20 





  Metoclopramide 10 Mg/2 Ml Inj  IV  





  Q6H PRN  





  Nausea And Vomiting  


 


Morphine Sulfate  2 mg  21 00:20  10/01/21 13:34





  Morphine 2 Mg/1 Ml Inj  IV   2 mg





  Q4H PRN   Administration





  Pain, Moderate (4-6)  


 


Ondansetron HCl  4 mg  21 13:00 





  Ondansetron 4 Mg/2 Ml Inj  IV  





  Q8H PRN  





  Nausea And Vomiting  


 


Oxycodone/Acetaminophen  2 tab  21 11:19  21 14:14





  Oxycodone /Acetaminophen 5-325mg Tab  PO   2 tab





  Q4H PRN   Administration





  Pain, Moderate (4-6)  


 


Sodium Chloride  10 ml  21 12:00  21 21:24





  Sodium Chloride 0.9% 10 Ml Flush Syringe  IV   10 ml





  BID [place]   Administration


 


Sodium Chloride  10 ml  21 13:00  10/01/21 07:12





  Sodium Chloride 0.9% 10 Ml Flush Syringe  IV   10 ml





  PRN PRN   Administration





  LINE FLUSH  














Nutrition/Malnutrition Assess





- Dietary Evaluation


Nutrition/Malnutrition Findings: 


                                        





Nutrition Notes                                            Start:  21 

10:39


Freq:                                                      Status: Active       




Protocol:                                                                       




 Document     21 16:48  GB  (Rec: 21 16:54  GB  [id number])


 Nutrition Notes


     Initial or Follow up                        Reassessment


     Current Diagnosis                           COPD,Hypertension


     Other Pertinent Diagnosis                   nonhealing surgical wound,


                                                 hepatitis, cirrhosis


     Current Diet                                Cardiac Diet


     Labs/Tests                                  : Na 136, Creatinine 0.5,


                                                 glucose 205


     Pertinent Medications                       Humalog


     Height                                      5 ft 4 in


     Weight                                      72.6 kg


     Ideal Body Weight (kg)                      59.09


     BMI                                         27.4


     Weight change and time frame                weight moderately stable x 5


                                                 months


     Weight Status                               Overweight


     Subjective/Other Information                Per MD notes : ortho


                                                 determined no surgery needed,


                                                 unhealing wound to right hip,


                                                 unable to heal with medication


                                                 alone.  PO intake is 100%.


                                                 All nutrient needs are met


                                                 with PO intake of meals.


     Percent of energy/protein needs met:        75% or greater


     Burn                                        Absent


     Trauma                                      Absent


     GI Symptoms                                 None


     Food Allergy                                No


     Current % PO                                Good (%)


     Minimum of two criteria                     No


     #1


      Nutrition Diagnosis                        Increased nutrient needs   (


                                                 specify in comment below)


      Comments:                                  protein


      Etiology                                   wound healing


      As Evidenced by Signs and Symptoms         surgical wound present


      Diagnosis Progress(for reassessment        Continues


       documentation)                            


     Is patient on ventilator?                   No


     Is Patient Ambulatory and/or Out of Bed     No


     REE-([facility]-confined to bed)      1735.380


     Kcal/Kg value to use for calculation        25


     Approximate Energy Requirements Using       1815


      kcal/Kg                                    


     Calculation Used for Recommendations        Kcal/kg


     Additional Notes                            Protein 1-1.2 g/kg @ 72k-


                                                 86g


                                                 Fluids: 1 ml/kcal or per MD


 Nutrition Intervention


     Change Diet Order:                          Continue current diet


     Nutrition Support:                          n/a


     Add Supplement/Snack (indicate name/kcal    Kulwinder BID


      /protein )                                 


     Provides kCal:                              95


     Provides Protein (gm)                       5


     Goal #1                                     Wound healing


     Goal #2                                     PO intake of meals to continue


                                                 at 75% or greater TID daily


                                                 for LOS


     Revisit per MD consult or patient           Sign Off


      request:

## 2021-10-01 NOTE — PROGRESS NOTE
Assessment and Plan





right hip infection, s/p THR, continue IVAB and observation





Subjective


Date of service: 10/01/21


Interval history: 





c/o incisionl pain, otherwise ok





Objective


Vital signs: 


                               Vital Signs - 12hr











  10/01/21 10/01/21 10/01/21





  03:53 07:14 07:31


 


Temperature 98.4 F  


 


Pulse Rate   


 


Respiratory 18 18 22





Rate   


 


Blood Pressure 134/68  149/70


 


O2 Sat by Pulse   





Oximetry   














  10/01/21 10/01/21 10/01/21





  11:00 11:47 13:34


 


Temperature  98.3 F 


 


Pulse Rate  90 


 


Respiratory  20 20





Rate   


 


Blood Pressure  142/72 


 


O2 Sat by Pulse 97 98 





Oximetry   











Narrative Exam: 





right hip - dressing and packing removed today...moderate drainage noted...





- Labs


CBC & BMP: 


                                 09/23/21 15:15





                                 09/26/21 18:07


Labs: 


                              Abnormal lab results











  09/30/21 09/30/21 10/01/21 Range/Units





  15:17 20:43 07:18 


 


POC Glucose  191 H  174 H  151 H  ()  mg/dL














  10/01/21 Range/Units





  11:47 


 


POC Glucose  182 H  ()  mg/dL

## 2021-10-01 NOTE — PROGRESS NOTE
Assessment and Plan


Cultures:


9/23/2021 blood culture: no growth


9/24/2021 wound culture: Candida albicans, previously also reported GNR on the 

report initially but later addended to just Candida


9/29/2021 right hip surgical culture: Gram stain negative, culture in process.





A/P:


61-year-old male with COPD, nicotine dependence, alcohol abuse, cirrhosis, 

currently incarcerated, recently hospitalized with right hip fracture and 

underwent right DIMPLE on 8/12/2021:





#Surgical site infection, right hip prosthetic joint infection, periprosthetic 

fracture and concern for abscess: Hip x-ray revealed a periprosthetic fracture, 

CT revealed a periprosthetic fracture and a complex collection tracking along 

the lateral aspect of the proximal femur with a sinus tract extending to the 

subcutaneous tissues concerning for abscess formation. This meets major MSIS 

criteria for prosthetic joint infection. 





Underwent I&D of the right hip, old clotted hematoma was noted, no actual 

purulent material was seen as per the operative note.  Cultures were sent and 

are in progress.





Recs:


-continue Ceftriaxone for now


-CRP ordered but not collected yet


-follow up deep OR cultures at 72 hours to help decide antibiotic plan and 

duration








Kacy Hawkins MD, FACP


[facility] ([facility])


O: [phone]


F: [phone]








Subjective


Date of service: 10/01/21


Interval history: 


No fever. No new complaints. Poor historian. 





Objective





- Exam


Narrative Exam: 





Physical Exam: 


Constitutional: Alert, cooperative. No acute distress


Head, Ears, Nose: Normocephalic, atraumatic. External ears, nose normal


Eyes: Conjunctivae/corneas clear. No icterus. No ptosis.


Neck: Supple, no meningeal signs


Cardiovascular: S1, S2 +


Respiratory: Good air entry, clear to auscultation bilaterally


GI: Soft, non-tender; bowel sounds normal. No peritoneal signs


Musculoskeletal: Right hip incision with dressing present


Skin: No rash or abscess


Hem/Lymphatic: No palpable cervical or supraclavicular nodes. No lymphangitis


Psych: Mood ok. Affect normal


Neurological: Awake, alert, oriented. No gross abnormality      





- Constitutional


Vitals: 


                                   Vital Signs











Temp Pulse Resp BP Pulse Ox


 


 98.3 F   90   20   142/72   98 


 


 10/01/21 11:47  10/01/21 11:47  10/01/21 11:47  10/01/21 11:47  10/01/21 11:47








                           Temperature -Last 24 Hours











Temperature                    98.3 F


 


Temperature                    98.4 F


 


Temperature                    98.6 F


 


Temperature                    98.6 F


 


Temperature                    98.2 F

















- Labs


CBC & Chem 7: 


                                 09/23/21 15:15





                                 09/26/21 18:07


Labs: 


                              Abnormal lab results











  09/30/21 09/30/21 10/01/21 Range/Units





  15:17 20:43 07:18 


 


POC Glucose  191 H  174 H  151 H  ()  mg/dL














  10/01/21 Range/Units





  11:47 


 


POC Glucose  182 H  ()  mg/dL

## 2021-10-02 NOTE — PROGRESS NOTE
Assessment and Plan


Assessment and plan: 


61-year-old male with history of EtOH dependence, hepatitis/cirrhosis, diabetes 

and COPD had total right hip replacement on 2021 and [address] [place] [address].  Patient was doing well for couple of weeks.  Patient has been 

incarcerated for the last 13 months.  Over the last couple weeks patient has 





Previous admission in August 1 week


Patient is 59 yo  Male with COPD, Nicotine Dependence, ETOH Dependence 

complicated by Cirrhosis, EDWAR, Mild Intermittent Asthma presents to ED for 

evaluation following a fall.  The patient was seen and evaluated in the 

emergency department. Patient found to have right hip fracture, as well as 

metabolic acidosis.  Patient admitted to surgical floor.  Orthopedic surgery 

service consulted.


Right hip fracture with right total hip replacement on 2021





Cultures growing GNR and Candida





- Imaging and Cardiology





Lower extremity CAT scan


Periprosthetic fracture centered over the tip of the femoral stem component


Complex collection tracking along the lateral border of the proximal femur as 

outlined above with sinus tract extending to the subcutaneous tissues along the 

lateral proximal thigh


Findings are worrisome for abscess formation








(1) Postoperative abscess


Current Visit: Yes   Status: Acute   


Plan to address problem: 


Abscess in the right hip region at the postoperative site with possible 

recurrent fracture


IV Unasyn and IV vancomycin started


Orthopedic consult


May need revision surgery and abscess drainage


ID also consulted





(2) COPD (chronic obstructive pulmonary disease)


Current Visit: No   Status: Chronic   


Qualifiers: 


   COPD type: chronic bronchitis 


Plan to address problem: 


Nebulizer treatments as needed








(3) Hypertension


Current Visit: Yes   Status: Chronic   


Qualifiers: 


   Hypertension type: primary hypertension   Qualified Code(s): I10 - Essential 

(primary) hypertension   


Plan to address problem: 


Continue antihypertensives and adjust medications








(4) T2DM (type 2 diabetes mellitus)


Current Visit: Yes   Status: Chronic   


Qualifiers: 


   Diabetes mellitus long term insulin use: unspecified long term insulin use 

status 


Plan to address problem: 


Continue coverage and check hemoglobin A1c








(5) DVT prophylaxis


Current Visit: Yes   Status: Acute   


Plan to address problem: 


On heparin and GI prophylaxis








-Patient is admitted for right hip fracture, abscess on the right hip area


-Orthopedics and ID consulted


-Patient is currently on Unasyn and vancomycin


-Blood pressure controlled, blood sugars in target


-Continue current management and follow with orthopedics and ID.








-I&D of the abscess was done by orthopedics.  Recommend conservative management


-ID saw the patient and recommend to continue empiric cefepime and vancomycin


-Wound cultures pending


-Patient has severe pain and on Percocet


-PT evaluation








-Continue with empiric cefepime and vancomycin, Follow with ID for discharge 

planning.  Blood cultures show no growth so far.





: Patient seen and examined resting comfortably.  Surgical site infection 

with complex collection tracking along the lateral aspect of proximal femur with

sinus tract extending to the subcutaneous tissue concerning for abscess 

formation which has been I indeed.  Cultures growing gram-negative paul we will 

continue Cefepime but infectious disease.  Also for recommendation that Ortho 

should reevaluate the patient as this cannot be cured by antibiotics alone.  

Patient is a ball of the state and remains in police custody at this time.  

Dressing is intact no overt drainage noted





: Continue supportive care.  Awaiting surgical reevaluation.  The patient 

tells me that the surgeon said they may need to do a hip replacement.  I will 

also reach out to the surgeon for now continue treatment with antibiotics.  

Discussed with the officer and bedside and also with the patient.  Continue with

management PT OT





: Patient is going to the OR today for revision of arthroplasty and possible

hip replacement.  Continue current antibiotic therapy and follow recommendation 

by ID and orthopedic surgeon.  Plan discussed with the patient in detail





: Patient seen and examined this morning tolerating diet.  His tells me he 

did not have surgery yesterday but the nurse states that he did indeed go for a 

washout and debridement I have not seen the full surgical note order anesthesia 

note is also noted.  We will continue antibiotics await cultures from the study.

 Further recommendation per ID and surgery.





10/1: Continue supportive care continue current management.  Antibiotic therapy 

wound management.  Encourage the patient to be compliant with therapy.  Awaiting

cultures at 72 hours per ID to further guide therapy.  We will check labs in 

a.m.





10/2: Continue Supportive, no growth, continue abx, wound care, manage drainage.

Anticipate discharge 72 probably in AM if cultures remains. 








History


Interval history: 


Patient seen and examined resting comfortable.  Patient underwent revision 

surgery yesterday with washout debridement. 





Hospitalist Physical





- Physical exam


Narrative exam: 


 Not in cardiopulmonary distress. 


 The patient appeared well nourished and normally developed.


 Vital signs as documented.


 Head exam is unremarkable.


 No scleral icterus .


 Neck is without jugular venous distension, thyromegaly, or carotid bruits. 


 Lungs are clear to auscultation.


Cardiac exam reveals regular rate and  Rhythm. 


Abdominal exam reveals normal bowel sounds, nontender, no organomegaly.


Extremities are nonedematous and both femoral and pedal pulses are normal.  

Dressing noted around the right hip joint with right hip - dressing and packing 

removed today...moderate drainage noted.


CNS: Alert and oriented 3.  No focal weakness.








- Constitutional


Vitals: 


                                        











Temp Pulse Resp BP Pulse Ox


 


 98.4 F   92 H  18   140/78   98 


 


 10/02/21 03:47  10/02/21 03:47  10/02/21 03:47  10/02/21 03:47  10/02/21 03:47











General appearance: Present: no acute distress, well-nourished





Results





- Labs


CBC & Chem 7: 


                                 21 15:15





                                 21 18:07


Labs: 


                             Laboratory Last Values











WBC  5.5 K/mm3 (4.5-11.0)   21  15:15    


 


RBC  4.58 M/mm3 (3.65-5.03)   21  15:15    


 


Hgb  14.0 gm/dl (11.8-15.2)   21  15:15    


 


Hct  41.7 % (35.5-45.6)   21  15:15    


 


MCV  91 fl (84-94)   21  15:15    


 


MCH  31 pg (28-32)   21  15:15    


 


MCHC  34 % (32-34)   21  15:15    


 


RDW  15.8 % (13.2-15.2)  H  21  15:15    


 


Plt Count  126 K/mm3 (140-440)  L  21  15:15    


 


Lymph % (Auto)  22.4 % (13.4-35.0)   21  15:15    


 


Mono % (Auto)  10.9 % (0.0-7.3)  H  21  15:15    


 


Eos % (Auto)  2.1 % (0.0-4.3)   21  15:15    


 


Baso % (Auto)  0.5 % (0.0-1.8)   21  15:15    


 


Lymph # (Auto)  1.2 K/mm3 (1.2-5.4)   21  15:15    


 


Mono # (Auto)  0.6 K/mm3 (0.0-0.8)   21  15:15    


 


Eos # (Auto)  0.1 K/mm3 (0.0-0.4)   21  15:15    


 


Baso # (Auto)  0.0 K/mm3 (0.0-0.1)   21  15:15    


 


Seg Neutrophils %  64.1 % (40.0-70.0)   21  15:15    


 


Seg Neutrophils #  3.5 K/mm3 (1.8-7.7)   21  15:15    


 


Sodium  136 mmol/L (137-145)  L  21  18:07    


 


Potassium  4.3 mmol/L (3.6-5.0)   21  18:07    


 


Chloride  102.0 mmol/L ()   21  18:07    


 


Carbon Dioxide  25 mmol/L (22-30)   21  18:07    


 


Anion Gap  13 mmol/L  21  18:07    


 


BUN  16 mg/dL (9-20)   21  18:07    


 


Creatinine  0.5 mg/dL (0.8-1.3)  L  21  18:07    


 


Estimated GFR  > 60 ml/min  21  18:07    


 


BUN/Creatinine Ratio  32 %  21  18:07    


 


Glucose  205 mg/dL ()  H  21  18:07    


 


POC Glucose  243 mg/dL ()  H  10/01/21  22:23    


 


Hemoglobin A1c  6.9 % (4-6)  H  21  15:15    


 


Calcium  8.8 mg/dL (8.4-10.2)   21  18:07    


 


Urine Color  Yellow  (Yellow)   21  Unknown


 


Urine Turbidity  Slightly-cloudy  (Clear)   21  Unknown


 


Urine pH  6.0  (5.0-7.0)   21  Unknown


 


Ur Specific Gravity  1.024  (1.003-1.030)   21  Unknown


 


Urine Protein  30 mg/dl mg/dL (Negative)   21  Unknown


 


Urine Glucose (UA)  50 mg/dL (Negative)   21  Unknown


 


Urine Ketones  Neg mg/dL (Negative)   21  Unknown


 


Urine Blood  Neg  (Negative)   21  Unknown


 


Urine Nitrite  Neg  (Negative)   21  Unknown


 


Urine Bilirubin  Neg  (Negative)   21  Unknown


 


Urine Urobilinogen  4.0 mg/dL (<2.0)   21  Unknown


 


Ur Leukocyte Esterase  Neg  (Negative)   21  Unknown


 


Urine WBC (Auto)  3.0 /HPF (0.0-6.0)   21  Unknown


 


Urine RBC (Auto)  4.0 /HPF (0.0-6.0)   21  Unknown


 


Calcium Oxalate Crystal  Few   21  Unknown


 


Urine Mucus  1+ /HPF  21  Unknown


 


Urine Yeast (Budding)  1+ /HPF  21  Unknown


 


Vancomycin Trough  12.2 ug/mL (5.0-20.0)   21  21:10    


 


Urine Opiates Screen  Negative   21  Unknown


 


Urine Methadone Screen  Negative   21  Unknown


 


Ur Barbiturates Screen  Negative   21  Unknown


 


Ur Phencyclidine Scrn  Negative   21  Unknown


 


Ur Amphetamines Screen  Negative   21  Unknown


 


U Benzodiazepines Scrn  Negative   21  Unknown


 


Urine Cocaine Screen  Negative   21  Unknown


 


U Marijuana (THC) Screen  Negative   21  Unknown


 


Drugs of Abuse Note  Disclamer   21  Unknown











Microbiology: 


Microbiology





21 Unknown   Hip - Right   Surgical Culture - Preliminary


21 Unknown   Hip - Right   Anaerobic Culture - Preliminary








Perdue/IV: 


                                        





Voiding Method                   Toilet











Active Medications





- Current Medications


Current Medications: 














Generic Name Dose Route Start Last Admin





  Trade Name Freq  PRN Reason Stop Dose Admin


 


Acetaminophen  650 mg  21 13:00  21 10:21





  Acetaminophen 325 Mg Tab  PO   650 mg





  Q4H PRN   Administration





  Pain MILD(1-3)/Fever >100.5/HA  


 


Albuterol  2.5 mg  21 09:00 





  Albuterol 2.5 Mg/3 Ml Nebu  IH  





  Q4HRT PRN  





  Shortness Of Breath  


 


Doxepin HCl  50 mg  21 22:00  10/01/21 22:55





  Doxepin 25 Mg Cap  PO   50 mg





  [facility]   Administration


 


Famotidine  20 mg  21 10:00  10/01/21 22:57





  Famotidine 20 Mg/2 Ml Inj  IV   20 mg





  BID [place]   Administration


 


Heparin Sodium (Porcine)  5,000 unit  21 10:00  10/01/21 22:57





  Heparin 5,000 Unit/1 Ml Vial  SUB-Q   5,000 unit





  Q12HR [facility]   Administration


 


Ceftriaxone Sodium  2 gm in 100 mls @ 200 mls/hr  21 14:00  10/01/21 09:44





  Rocephin/Ns 2 Gm/100 Ml  IV   200 mls/hr





  Q24HR [facility]   Administration





  Protocol  


 


Insulin Glargine  20 units  21 22:00  10/01/21 22:56





  Insulin Glargine 100 Units/Ml  SUB-Q   20 units





  [facility]   Administration


 


Insulin Human Lispro  0 unit  21 11:30  10/01/21 22:57





  Insulin Lispro 100 Unit/Ml  SUB-Q   4 unit





  [facility]   Administration





  Protocol  


 


Metoclopramide HCl  10 mg  21 00:20 





  Metoclopramide 10 Mg/2 Ml Inj  IV  





  Q6H PRN  





  Nausea And Vomiting  


 


Morphine Sulfate  2 mg  21 00:20  10/01/21 18:21





  Morphine 2 Mg/1 Ml Inj  IV   2 mg





  Q4H PRN   Administration





  Pain, Moderate (4-6)  


 


Ondansetron HCl  4 mg  21 13:00 





  Ondansetron 4 Mg/2 Ml Inj  IV  





  Q8H PRN  





  Nausea And Vomiting  


 


Oxycodone/Acetaminophen  2 tab  21 11:19  21 14:14





  Oxycodone /Acetaminophen 5-325mg Tab  PO   2 tab





  Q4H PRN   Administration





  Pain, Moderate (4-6)  


 


Sodium Chloride  10 ml  21 12:00  10/01/21 22:58





  Sodium Chloride 0.9% 10 Ml Flush Syringe  IV   Not Given





  BID [place]  


 


Sodium Chloride  10 ml  21 13:00  10/01/21 07:12





  Sodium Chloride 0.9% 10 Ml Flush Syringe  IV   10 ml





  PRN PRN   Administration





  LINE FLUSH  














Nutrition/Malnutrition Assess





- Dietary Evaluation


Nutrition/Malnutrition Findings: 


                                        





Nutrition Notes                                            Start:  21 

10:39


Freq:                                                      Status: Active       




Protocol:                                                                       




 Document     21 16:48  GB  (Rec: 21 16:54  GB  [id number])


 Nutrition Notes


     Initial or Follow up                        Reassessment


     Current Diagnosis                           COPD,Hypertension


     Other Pertinent Diagnosis                   nonhealing surgical wound,


                                                 hepatitis, cirrhosis


     Current Diet                                Cardiac Diet


     Labs/Tests                                  : Na 136, Creatinine 0.5,


                                                 glucose 205


     Pertinent Medications                       Humalog


     Height                                      5 ft 4 in


     Weight                                      72.6 kg


     Ideal Body Weight (kg)                      59.09


     BMI                                         27.4


     Weight change and time frame                weight moderately stable x 5


                                                 months


     Weight Status                               Overweight


     Subjective/Other Information                Per MD notes : ortho


                                                 determined no surgery needed,


                                                 unhealing wound to right hip,


                                                 unable to heal with medication


                                                 alone.  PO intake is 100%.


                                                 All nutrient needs are met


                                                 with PO intake of meals.


     Percent of energy/protein needs met:        75% or greater


     Burn                                        Absent


     Trauma                                      Absent


     GI Symptoms                                 None


     Food Allergy                                No


     Current % PO                                Good (%)


     Minimum of two criteria                     No


     #1


      Nutrition Diagnosis                        Increased nutrient needs   (


                                                 specify in comment below)


      Comments:                                  protein


      Etiology                                   wound healing


      As Evidenced by Signs and Symptoms         surgical wound present


      Diagnosis Progress(for reassessment        Continues


       documentation)                            


     Is patient on ventilator?                   No


     Is Patient Ambulatory and/or Out of Bed     No


     REE-([facility]-confined to bed)      [phone]


     Kcal/Kg value to use for calculation        25


     Approximate Energy Requirements Using       1815


      kcal/Kg                                    


     Calculation Used for Recommendations        Kcal/kg


     Additional Notes                            Protein 1-1.2 g/kg @ 72k-


                                                 86g


                                                 Fluids: 1 ml/kcal or per MD


 Nutrition Intervention


     Change Diet Order:                          Continue current diet


     Nutrition Support:                          n/a


     Add Supplement/Snack (indicate name/kcal    Kulwinder BID


      /protein )                                 


     Provides kCal:                              95


     Provides Protein (gm)                       5


     Goal #1                                     Wound healing


     Goal #2                                     PO intake of meals to continue


                                                 at 75% or greater TID daily


                                                 for LOS


     Revisit per MD consult or patient           Sign Off


      request:

## 2021-10-03 NOTE — DISCHARGE SUMMARY
Providers





- Providers


Date of Admission: 


09/24/21 16:36





Attending physician: 


SALUD OLGUIN MD





                                        





09/23/21 18:03


Consult to Physician [CONS] Urgent 


   Comment: 


   Consulting Provider: GRAEME JOYCE


   Physician Instructions: 


   Reason For Exam: Possible postop abscess





09/24/21 07:40


Consult to Physician [CONS] Routine 


   Comment: 


   Consulting Provider: TERRENCE MAGDALENO


   Physician Instructions: 


   Reason For Exam: Rt hip infection, s/p right hip replacement Aug21 09/25/21 11:21


Physical Therapy Evaluation and Treat [CONS] Routine 


   Comment: 


   Reason For Exam: Evaluate and treat





09/25/21 11:22


Occupational Therapy Evaluate and Treat [CONS] Routine 


   Comment: 


   Reason For Exam: Evaluate and treat





10/01/21 14:24


Physical Therapy Evaluation and Treat [CONS] Routine 


   Comment: 


   Reason For Exam: evaluation and treatment


   Weight bearing status?: Full wt bearing


   Assistive devices?: Yes


   If so list: Walker





10/02/21 07:44


Consult to Wound/ET Nurse [CONS] Routine 


   Reason For Exam: wound eval/WOUND VAC TO R-HIP WOUND











Primary care physician: 


PRIMARY CARE MD








Hospitalization


Reason for admission: joint infection


Condition: Fair


Hospital course: 


61-year-old male with history of EtOH dependence, hepatitis/cirrhosis, diabetes 

and COPD had total right hip replacement on 8/12/2021 and [address] [place] [address].  Patient was doing well for couple of weeks.  Patient has been 

incarcerated for the last 13 months.  Over the last couple weeks patient has 





Previous admission in August 1 week


Patient is 61 yo  Male with COPD, Nicotine Dependence, ETOH Dependence 

complicated by Cirrhosis, EDWAR, Mild Intermittent Asthma presents to ED for 

evaluation following a fall.  The patient was seen and evaluated in the 

emergency department. Patient found to have right hip fracture, as well as 

metabolic acidosis.  Patient admitted to surgical floor.  Orthopedic surgery 

service consulted.


Right hip fracture with right total hip replacement on 8/12/2021





Cultures growing GNR and Candida





- Imaging and Cardiology





Lower extremity CAT scan


Periprosthetic fracture centered over the tip of the femoral stem component


Complex collection tracking along the lateral border of the proximal femur as 

outlined above with sinus tract extending to the subcutaneous tissues along the 

lateral proximal thigh


Findings are worrisome for abscess formation








(1) Postoperative abscess-Surgical site infection, right hip prosthetic joint 

infection


Current Visit: Yes   Status: Acute   


Plan to address problem: 


Abscess in the right hip region at the postoperative site with possible 

recurrent fracture


IV Unasyn and IV vancomycin started


Orthopedic consult


May need revision surgery and abscess drainage


ID also consulted





(2) COPD (chronic obstructive pulmonary disease)


Current Visit: No   Status: Chronic   


Qualifiers: 


   COPD type: chronic bronchitis 


Plan to address problem: 


Nebulizer treatments as needed








(3) Hypertension


Current Visit: Yes   Status: Chronic   


Qualifiers: 


   Hypertension type: primary hypertension   Qualified Code(s): I10 - Essential 

(primary) hypertension   


Plan to address problem: 


Continue antihypertensives and adjust medications








(4) T2DM (type 2 diabetes mellitus)


Current Visit: Yes   Status: Chronic   


Qualifiers: 


   Diabetes mellitus long term insulin use: unspecified long term insulin use 

status 


Plan to address problem: 


Continue coverage and check hemoglobin A1c








9/24


-Patient is admitted for right hip fracture, abscess on the right hip area


-Orthopedics and ID consulted


-Patient is currently on Unasyn and vancomycin


-Blood pressure controlled, blood sugars in target


-Continue current management and follow with orthopedics and ID.





9/25


-I&D of the abscess was done by orthopedics.  Recommend conservative management


-ID saw the patient and recommend to continue empiric cefepime and vancomycin


-Wound cultures pending


-Patient has severe pain and on Percocet


-PT evaluation





9/26


-Continue with empiric cefepime and vancomycin, Follow with ID for discharge 

planning.  Blood cultures show no growth so far.





9/27: Patient seen and examined resting comfortably.  Surgical site infection 

with complex collection tracking along the lateral aspect of proximal femur with

 sinus tract extending to the subcutaneous tissue concerning for abscess 

formation which has been I indeed.  Cultures growing gram-negative paul we will 

continue Cefepime but infectious disease.  Also for recommendation that Ortho 

should reevaluate the patient as this cannot be cured by antibiotics alone.  

Patient is a ball of the state and remains in police custody at this time.  

Dressing is intact no overt drainage noted





09/28: Continue supportive care.  Awaiting surgical reevaluation.  The patient 

tells me that the surgeon said they may need to do a hip replacement.  I will 

also reach out to the surgeon for now continue treatment with antibiotics.  

Discussed with the officer and bedside and also with the patient.  Continue with

 management PT OT





9/29: Patient is going to the OR today for revision of arthroplasty and possible

 hip replacement.  Continue current antibiotic therapy and follow recommendation

 by ID and orthopedic surgeon.  Plan discussed with the patient in detail





9/30: Patient seen and examined this morning tolerating diet.  His tells me he 

did not have surgery yesterday but the nurse states that he did indeed go for a 

washout and debridement I have not seen the full surgical note order anesthesia 

note is also noted.  We will continue antibiotics await cultures from the study.

  Further recommendation per ID and surgery.





10/1: Continue supportive care continue current management.  Antibiotic therapy 

wound management.  Encourage the patient to be compliant with therapy.  Awaiting

 cultures at 72 hours per ID to further guide therapy.  We will check labs in 

a.m.





10/2: Continue Supportive, no growth, continue abx, wound care, manage drainage.

 Anticipate discharge 72 probably in AM if cultures remains. 





10/3: Patient remains clinically stable cultures from the surgical site are 

negative.  Wound culture shows Candida.  Discussed with ID patient can be 

discharged on fluconazole 400 mg x 14 days and Levaquin 750 x 14 days.  Patient 

will follow with orthopedic surgeon continue wound care at the long-term


Disposition: [address]


Final Discharge Diagnosis (Prints w/discharge instructions): Postoperative 

abscess-Surgical site infection, right hip prosthetic joint infection


Time spent for discharge: 35 mins





Core Measure Documentation





- Palliative Care


Palliative Care/ Comfort Measures: Not Applicable





- Core Measures


Any of the following diagnoses?: none





Exam





- Physical Exam


Narrative exam: 


 Not in cardiopulmonary distress. 


 The patient appeared well nourished and normally developed.


 Vital signs as documented.


 Head exam is unremarkable.


 No scleral icterus .


 Neck is without jugular venous distension, thyromegaly, or carotid bruits. 


 Lungs are clear to auscultation.


Cardiac exam reveals regular rate and  Rhythm. 


Abdominal exam reveals normal bowel sounds, nontender, no organomegaly.


Extremities are nonedematous and both femoral and pedal pulses are normal.  

Dressing noted around the right hip joint with right hip - dressing and packing 

in place


CNS: Alert and oriented 3.  No focal weakness.








- Constitutional


Vitals: 


                                        











Temp Pulse Resp BP Pulse Ox


 


 97.8 F   74   18   110/64   100 


 


 10/03/21 08:15  10/03/21 08:15  10/03/21 08:15  10/03/21 08:15  10/03/21 08:15














Plan


Activity: advance as tolerated, fall precautions


Diet: low fat


Wound: per your surgeon's advice, per wound nurse instructions


Special Instructions: record daily weights, record daily BP diary, physical 

therapy, occupational therapy


Follow up with: 


PRIMARY CARE,MD [Primary Care Provider] - 3-5 Days


GRAEME JOYCE MD [Staff Physician] - 7 Days


TERRENCE MAGDALENO MD [Staff Physician] - 7 Days


[facility] [Outside] - 7 Days


Prescriptions: 


Insulin Glargine [Lantus VIAL] 20 units SUB-Q QHS #10 ml


Doxepin [SINEquan] 50 mg PO QHS #30 capsule


Fluconazole [Diflucan TAB] 400 mg PO QDAY #14 tablet


levoFLOXacin [Levaquin] 750 mg PO QDAY #14 tablet


Famotidine [Pepcid] 20 mg PO DAILY #30 tablet


oxyCODONE /ACETAMINOPHEN [Percocet 5/325 mg] 1 tab PO Q4H PRN #14 tablet


 PRN Reason: Pain, Moderate (4-6)